# Patient Record
Sex: MALE | Race: WHITE | HISPANIC OR LATINO
[De-identification: names, ages, dates, MRNs, and addresses within clinical notes are randomized per-mention and may not be internally consistent; named-entity substitution may affect disease eponyms.]

---

## 2022-07-20 ENCOUNTER — APPOINTMENT (OUTPATIENT)
Dept: VASCULAR SURGERY | Facility: CLINIC | Age: 62
End: 2022-07-20

## 2022-08-03 ENCOUNTER — APPOINTMENT (OUTPATIENT)
Dept: VASCULAR SURGERY | Facility: CLINIC | Age: 62
End: 2022-08-03

## 2022-08-03 VITALS
TEMPERATURE: 98.1 F | BODY MASS INDEX: 29.99 KG/M2 | WEIGHT: 180 LBS | DIASTOLIC BLOOD PRESSURE: 70 MMHG | HEIGHT: 65 IN | SYSTOLIC BLOOD PRESSURE: 107 MMHG

## 2022-08-03 DIAGNOSIS — Z86.39 PERSONAL HISTORY OF OTHER ENDOCRINE, NUTRITIONAL AND METABOLIC DISEASE: ICD-10-CM

## 2022-08-03 PROCEDURE — 99203 OFFICE O/P NEW LOW 30 MIN: CPT

## 2022-08-03 PROCEDURE — 93970 EXTREMITY STUDY: CPT

## 2022-08-03 RX ORDER — CEPHALEXIN 500 MG/1
500 CAPSULE ORAL
Qty: 30 | Refills: 0 | Status: ACTIVE | COMMUNITY
Start: 2022-05-17

## 2022-08-03 RX ORDER — PRIMIDONE 50 MG/1
50 TABLET ORAL
Qty: 30 | Refills: 0 | Status: ACTIVE | COMMUNITY
Start: 2022-07-07

## 2022-08-03 RX ORDER — SIMVASTATIN 40 MG/1
40 TABLET, FILM COATED ORAL
Qty: 90 | Refills: 0 | Status: ACTIVE | COMMUNITY
Start: 2022-07-07

## 2022-08-03 RX ORDER — ATORVASTATIN CALCIUM 20 MG/1
20 TABLET, FILM COATED ORAL
Qty: 90 | Refills: 0 | Status: ACTIVE | COMMUNITY
Start: 2022-07-26

## 2022-08-03 RX ORDER — NAPROXEN 375 MG/1
375 TABLET, DELAYED RELEASE ORAL
Qty: 30 | Refills: 0 | Status: ACTIVE | COMMUNITY
Start: 2022-03-14

## 2022-08-03 RX ORDER — CYCLOBENZAPRINE HYDROCHLORIDE 5 MG/1
5 TABLET, FILM COATED ORAL
Qty: 10 | Refills: 0 | Status: ACTIVE | COMMUNITY
Start: 2022-03-01

## 2022-08-03 RX ORDER — RIVAROXABAN 10 MG/1
10 TABLET, FILM COATED ORAL
Qty: 30 | Refills: 0 | Status: ACTIVE | COMMUNITY
Start: 2022-07-07

## 2022-08-03 RX ORDER — CEFPODOXIME PROXETIL 200 MG/1
200 TABLET, FILM COATED ORAL
Qty: 20 | Refills: 0 | Status: ACTIVE | COMMUNITY
Start: 2022-05-22

## 2022-08-03 NOTE — PHYSICAL EXAM
[FreeTextEntry1] : LE vein findings: \par Varicosities (varicose) bilateral \par Edema  bilateral \par Skin changes dry, flaky skin, stasis dermatitis, hyperpigmentation in the gator area\par Ulcer none\par CEAP classification C3\par Palpable DP pulses bilaterally\par \par

## 2022-08-03 NOTE — HISTORY OF PRESENT ILLNESS
[FreeTextEntry1] : Mr. FLASH VEGA is a 62 year M who presents for initial  evaluation of bilateral leg pain for the past several months. \par Mr. VEGA leg discomfort is associated with leg swelling, fatigue, heaviness, achiness, restlessness, muscle cramping, itchiness, dry, flaky skin, and skin darkening at the ankles. \par He complains of painful varicose veins.\par His symptoms have persisted despite conservative management with leg elevation, exercise, attempted weight loss, over-the-counter medications (ibuprofen), and compression stocking use for more than 3 months. \par His symptoms are aggravated by weight bearing, prolonged standing, prolonged sitting, extended travel, exercise.\par Nothing helps to alleviate patient's symptoms. \par His symptoms interfere with the his ADLs such as work, shopping and housekeeping. \par Mr. VEGA risks factor for venous disease are _obesity,family history of venous disease, history of varicose veins, spider veins.\par He works at [employer] and stands for prolonged periods of time with the inability to take frequent breaks to elevate their legs. \par Previous treatment, vein procedures and vein surgeries include: wearing compression stockings for more than 3 months with little or no relief \par \par

## 2022-08-03 NOTE — ASSESSMENT
[FreeTextEntry1] : Mr. FLASH VEGA is a 62 year with persistent and worsening left lower extremity venous insufficiency, CEAP classification C3 which is unresponsive to at least 3 months of compression stockings 20-30 mmHg, leg elevation, exercise and over the counter pain medication_(Ibuprofen). Patient has complaints of worsening leg discomfort with swelling, fatigue, heaviness, achiness, cramping, restlessness, and painful varicosities. The patient’s symptoms interfere with their ADL’s, such as walking, shopping and house work, and their ability to work and exercise. Patient has intact pulses in both legs without evidence of arterial insufficiency. \par Treatment is indicated not for cosmetic reasons but for symptomatic venous reflux disease with symptoms which is refractory to conservative therapy. Venous duplex study demonstrates left lower extremity venous insufficiency. The need for definitive effective treatment is based on severe, interfering and worsening reflux symptoms with evidence of venous insufficiency on venous ultrasound. \par Patient is a candidate for endovenous ablation treatment of the left GSV and stab phlebectomies\par The risks and benefits of endovenous ablation treatment versus continued conservative management were discussed with the patient. Patient chooses endovenous ablation treatments and stab phlebectomies. Treatment plan to be scheduled.\par

## 2022-08-15 RX ORDER — SODIUM BICARBONATE 84 MG/ML
8.4 INJECTION, SOLUTION INTRAVENOUS
Qty: 5 | Refills: 0 | Status: COMPLETED | COMMUNITY
Start: 2022-08-15 | End: 2022-08-25

## 2022-08-15 RX ORDER — LIDOCAINE HYDROCHLORIDE 10 MG/ML
1 INJECTION, SOLUTION INFILTRATION; PERINEURAL
Qty: 50 | Refills: 0 | Status: COMPLETED | COMMUNITY
Start: 2022-08-15 | End: 2022-08-25

## 2022-09-15 ENCOUNTER — APPOINTMENT (OUTPATIENT)
Dept: VASCULAR SURGERY | Facility: CLINIC | Age: 62
End: 2022-09-15

## 2022-09-15 PROCEDURE — 36475 ENDOVENOUS RF 1ST VEIN: CPT | Mod: LT

## 2022-09-15 NOTE — PROCEDURE
[FreeTextEntry1] : left GSV RFA [FreeTextEntry3] : Procedural safety checklist and time out completed:\par Confirmed patient identification (Patient Name, , and/or medical record number including when possible affirmation by patient or parent/family/other).\par Confirmed procedure with the patient. Consent present, accurate and signed. \par Confirmed special equipment and supplies are present.\par Sterility confirmed. Position verified. \par Site/ side is marked and visible and confirmed. \par Procedure confirmed by consent. Accurate consent including side and site.\par Review of medical records, including venous ultrasound, noting correct procedure including site and side.\par MD/PA verifies presence and review of imaging studies and or written report of imaging studies.\par Agreement on the procedure to be performed\par Time out completed.\par All of the above has been confirmed by the team.\par All patient-specific concerns have been addressed. \par \par Indication: left lower extremity varicose veins with inflammation, leg pain, leg swelling, and leg cramping.  Venous insufficiency/ reflux.\par \par Procedure: radiofrequency ablation of the left great saphenous vein. \par 	\par Mr. FLASH VEGA is a 62 year old M with a history of left lower extremity varicose veins previously seen in the office.  Ultrasound examination demonstrated venous insufficiency. A trial of compression stockings, exercise, elevation, and pain medication was attempted without relief and definitive treatment with radiofrequency ablation was offered. \par \par The patient has come for radiofrequency ablation treatment of the left great saphenous vein.\par I have discussed the risks of the procedure at length with the patient. The risks discussed were inclusive of but not limited to infection, irritation at the site of infiltration of local anesthesia, and also rare risk of deep venous thrombosis and pulmonary emboli. The patient agrees to proceed with the procedure. \par The patient was escorted into the procedure room and a time out called.\par The entire limb was prepped and draped in sterile fashion. The RF fiber was placed on the sterile field and connected by a sterile cable. Actuation, temperature, and impedance testing were performed to ensure that all components were connected and operating properly. \par The patient was placed on the procedure table and local anesthesia was instilled in the skin overlying the access site. Under ultrasound guidance, the vein was punctured with a micropuncture needle, using the anterior wall technique. A guide wire was now introduced through the needle, and the needle was then exchanged over the guide wire for a 7F sheath. The guide wire was removed and the RF probe was then placed into the left great saphenous vein through the sheath and position confirmed using ultrasound guidance. After the RF probe position was verified by ultrasound, tumescent anesthesia consisting of normal saline, 1% lidocaine with 8.4% sodium bicarbonate was infiltrated, under ultrasound guidance, precisely into the perivenous compartment along the entire length of the vein until a “halo” of fluid was noted around the vein. After RF probe position was again confirmed with ultrasound imaging, RF energy was applied. The probe was gradually and carefully withdrawn at a rate of 6.5cm/20seconds. \par \par 6 cycles of RF performed using the 7 cm probe\par Total treatment time was 120 seconds.\par The total volume injected was 400 cc\par Treatment length was 33 cm and \par The probe is 3.5 cm from the SFJ.\par \par Estimated Blood Loss: minimal\par Repeat ultrasound of the treated vein was performed confirming successful treatment. The catheter and sheath were withdrawn and hemostasis established with direct pressure. After assuring hemostasis, a sterile 4x4 was placed on the access site and an ACE compression wrap was applied. Patient tolerated procedure well. Patient was given post-procedure instructions and follow up appointment was scheduled.\par \par \par

## 2022-09-22 ENCOUNTER — APPOINTMENT (OUTPATIENT)
Dept: VASCULAR SURGERY | Facility: CLINIC | Age: 62
End: 2022-09-22

## 2022-09-22 DIAGNOSIS — I83.893 VARICOSE VEINS OF BILATERAL LOWER EXTREMITIES WITH OTHER COMPLICATIONS: ICD-10-CM

## 2022-09-22 PROCEDURE — 37765 STAB PHLEB VEINS XTR 10-20: CPT | Mod: LT

## 2022-09-22 PROCEDURE — 93971 EXTREMITY STUDY: CPT

## 2022-09-22 RX ORDER — SODIUM BICARBONATE 84 MG/ML
8.4 INJECTION, SOLUTION INTRAVENOUS
Qty: 5 | Refills: 0 | Status: COMPLETED | COMMUNITY
Start: 2022-09-09 | End: 2022-09-22

## 2022-09-22 RX ORDER — ALPRAZOLAM 0.5 MG/1
0.5 TABLET ORAL
Qty: 1 | Refills: 0 | Status: COMPLETED | COMMUNITY
Start: 2022-09-15 | End: 2022-09-22

## 2022-09-22 RX ORDER — ALPRAZOLAM 0.5 MG/1
0.5 TABLET ORAL
Qty: 1 | Refills: 0 | Status: COMPLETED | COMMUNITY
Start: 2022-09-09 | End: 2022-09-22

## 2022-09-22 RX ORDER — LIDOCAINE HYDROCHLORIDE 10 MG/ML
1 INJECTION, SOLUTION INFILTRATION; PERINEURAL
Qty: 50 | Refills: 0 | Status: COMPLETED | COMMUNITY
Start: 2022-09-09 | End: 2022-09-22

## 2022-09-22 NOTE — PROCEDURE
[FreeTextEntry1] : left stab phlebectomy [FreeTextEntry3] : Procedural safety checklist and time out completed:\par Confirmed patient identification (Patient Name, , and/or medical record number including when possible affirmation by patient or parent/family/other.\par Confirmed procedure with the patient. Consent present, accurate and signed. \par Confirmed special equipment and supplies are present.\par Sterility confirmed. Position verified. \par Site/ side is marked and visible and confirmed. \par Procedure confirmed by consent. Accurate consent including side and site.\par Review of medical records noting correct procedure including site and side.\par MD/PA verifies presence and review of imaging studies and or written report of imaging studies.\par Specify equipment are available for the planned procedure.\par MD/PA has marked the patient's procedural site and side.\par Agreement on the procedure to be performed\par Time out completed.\par All of the above has been confirmed by the team.\par All patient-specific concerns have been addressed. \par \par Indication:  left lower extremity varicose veins with inflammation, leg pain, leg swelling, and leg cramping.  \par \par Procedure: Stab phlebectomy left lower extremity\par \par  Mr. FLASH VEGA is a 62 year old M with a history of symptomatic left lower extremity varicose veins. A trial of compression stockings, exercise, elevation, and pain medication was attempted without relief and definitive treatment with microphlebectomy was offered. \par \par I have discussed the risks of the procedure at length with the patient. The risks discussed were inclusive of but not limited to infection, irritation at the site of infiltration of local anesthesia, and also rare risk of deep venous thrombosis and pulmonary emboli. The patient agrees to proceed with the procedure. \par The patient was escorted into the procedure room, the varicose veins for treatment were marked out and the patient placed on the examination table. The entire limb was prepped and draped in sterile fashion and a  time out was called. \par \par Local anesthesia using 25 cc 1% lidocaine was infiltrated using a 25 gauge needle over the previously marked prominent varicose vein sites.\par Multiple small stab incisions, each less than 1 cm in length was made at the noted sites. With the help of a vein hook, the vein was fished out at each of these sites, rolled over a narrow-tipped mosquito clamp and removed. Hemostasis was secured with leg elevation and application of manual pressure. After assuring hemostasis, a sterile 4x4 was placed on the access sites and an ACE compression wrap was applied. Estimated blood loss: minimal. Patient tolerated procedure well. Patient was given post-procedure instructions and follow up appointment was scheduled. \par \par SIDE - LEFT	\par SITE - CALF\par LOCATION - MEDIAL / POSTERIOR	\par Total Stab Incisions 10-20\par \par

## 2022-10-11 ENCOUNTER — APPOINTMENT (OUTPATIENT)
Dept: VASCULAR SURGERY | Facility: CLINIC | Age: 62
End: 2022-10-11

## 2024-04-21 ENCOUNTER — INPATIENT (INPATIENT)
Facility: HOSPITAL | Age: 64
LOS: 3 days | Discharge: HOME | End: 2024-04-25
Attending: STUDENT IN AN ORGANIZED HEALTH CARE EDUCATION/TRAINING PROGRAM | Admitting: STUDENT IN AN ORGANIZED HEALTH CARE EDUCATION/TRAINING PROGRAM
Payer: MEDICAID

## 2024-04-21 VITALS
TEMPERATURE: 98 F | RESPIRATION RATE: 20 BRPM | HEIGHT: 65 IN | HEART RATE: 60 BPM | DIASTOLIC BLOOD PRESSURE: 87 MMHG | SYSTOLIC BLOOD PRESSURE: 134 MMHG | WEIGHT: 201.94 LBS | OXYGEN SATURATION: 98 %

## 2024-04-21 LAB
ALBUMIN SERPL ELPH-MCNC: 2.7 G/DL — LOW (ref 3.3–5)
ALP SERPL-CCNC: 91 U/L — SIGNIFICANT CHANGE UP (ref 40–120)
ALT FLD-CCNC: 24 U/L — SIGNIFICANT CHANGE UP (ref 12–78)
ANION GAP SERPL CALC-SCNC: 7 MMOL/L — SIGNIFICANT CHANGE UP (ref 5–17)
APTT BLD: 26.7 SEC — SIGNIFICANT CHANGE UP (ref 24.5–35.6)
AST SERPL-CCNC: 21 U/L — SIGNIFICANT CHANGE UP (ref 15–37)
BASOPHILS # BLD AUTO: 0.03 K/UL — SIGNIFICANT CHANGE UP (ref 0–0.2)
BASOPHILS NFR BLD AUTO: 0.4 % — SIGNIFICANT CHANGE UP (ref 0–2)
BILIRUB SERPL-MCNC: 0.4 MG/DL — SIGNIFICANT CHANGE UP (ref 0.2–1.2)
BUN SERPL-MCNC: 30 MG/DL — HIGH (ref 7–23)
CALCIUM SERPL-MCNC: 8.7 MG/DL — SIGNIFICANT CHANGE UP (ref 8.5–10.1)
CHLORIDE SERPL-SCNC: 111 MMOL/L — HIGH (ref 96–108)
CK SERPL-CCNC: 141 U/L — SIGNIFICANT CHANGE UP (ref 26–308)
CO2 SERPL-SCNC: 24 MMOL/L — SIGNIFICANT CHANGE UP (ref 22–31)
CREAT SERPL-MCNC: 1.04 MG/DL — SIGNIFICANT CHANGE UP (ref 0.5–1.3)
D DIMER BLD IA.RAPID-MCNC: 240 NG/ML DDU — HIGH
EGFR: 81 ML/MIN/1.73M2 — SIGNIFICANT CHANGE UP
EOSINOPHIL # BLD AUTO: 0.34 K/UL — SIGNIFICANT CHANGE UP (ref 0–0.5)
EOSINOPHIL NFR BLD AUTO: 5.1 % — SIGNIFICANT CHANGE UP (ref 0–6)
GLUCOSE SERPL-MCNC: 114 MG/DL — HIGH (ref 70–99)
HCT VFR BLD CALC: 43.6 % — SIGNIFICANT CHANGE UP (ref 39–50)
HGB BLD-MCNC: 13.9 G/DL — SIGNIFICANT CHANGE UP (ref 13–17)
IMM GRANULOCYTES NFR BLD AUTO: 0.3 % — SIGNIFICANT CHANGE UP (ref 0–0.9)
INR BLD: 0.87 RATIO — SIGNIFICANT CHANGE UP (ref 0.85–1.18)
LIDOCAIN IGE QN: 47 U/L — SIGNIFICANT CHANGE UP (ref 13–75)
LYMPHOCYTES # BLD AUTO: 1.85 K/UL — SIGNIFICANT CHANGE UP (ref 1–3.3)
LYMPHOCYTES # BLD AUTO: 27.7 % — SIGNIFICANT CHANGE UP (ref 13–44)
MAGNESIUM SERPL-MCNC: 2.1 MG/DL — SIGNIFICANT CHANGE UP (ref 1.6–2.6)
MCHC RBC-ENTMCNC: 28.3 PG — SIGNIFICANT CHANGE UP (ref 27–34)
MCHC RBC-ENTMCNC: 31.9 G/DL — LOW (ref 32–36)
MCV RBC AUTO: 88.6 FL — SIGNIFICANT CHANGE UP (ref 80–100)
MONOCYTES # BLD AUTO: 0.5 K/UL — SIGNIFICANT CHANGE UP (ref 0–0.9)
MONOCYTES NFR BLD AUTO: 7.5 % — SIGNIFICANT CHANGE UP (ref 2–14)
NEUTROPHILS # BLD AUTO: 3.94 K/UL — SIGNIFICANT CHANGE UP (ref 1.8–7.4)
NEUTROPHILS NFR BLD AUTO: 59 % — SIGNIFICANT CHANGE UP (ref 43–77)
NRBC # BLD: 0 /100 WBCS — SIGNIFICANT CHANGE UP (ref 0–0)
NT-PROBNP SERPL-SCNC: 84 PG/ML — SIGNIFICANT CHANGE UP (ref 0–125)
PLATELET # BLD AUTO: 182 K/UL — SIGNIFICANT CHANGE UP (ref 150–400)
POTASSIUM SERPL-MCNC: 4.4 MMOL/L — SIGNIFICANT CHANGE UP (ref 3.5–5.3)
POTASSIUM SERPL-SCNC: 4.4 MMOL/L — SIGNIFICANT CHANGE UP (ref 3.5–5.3)
PROT SERPL-MCNC: 7.3 GM/DL — SIGNIFICANT CHANGE UP (ref 6–8.3)
PROTHROM AB SERPL-ACNC: 10.5 SEC — SIGNIFICANT CHANGE UP (ref 9.5–13)
RBC # BLD: 4.92 M/UL — SIGNIFICANT CHANGE UP (ref 4.2–5.8)
RBC # FLD: 14.5 % — SIGNIFICANT CHANGE UP (ref 10.3–14.5)
SODIUM SERPL-SCNC: 142 MMOL/L — SIGNIFICANT CHANGE UP (ref 135–145)
TROPONIN I, HIGH SENSITIVITY RESULT: 14.2 NG/L — SIGNIFICANT CHANGE UP
WBC # BLD: 6.68 K/UL — SIGNIFICANT CHANGE UP (ref 3.8–10.5)
WBC # FLD AUTO: 6.68 K/UL — SIGNIFICANT CHANGE UP (ref 3.8–10.5)

## 2024-04-21 PROCEDURE — 70498 CT ANGIOGRAPHY NECK: CPT | Mod: 26,MC

## 2024-04-21 PROCEDURE — 99291 CRITICAL CARE FIRST HOUR: CPT

## 2024-04-21 PROCEDURE — 93010 ELECTROCARDIOGRAM REPORT: CPT

## 2024-04-21 PROCEDURE — 70450 CT HEAD/BRAIN W/O DYE: CPT | Mod: 26,MC,59

## 2024-04-21 PROCEDURE — 71045 X-RAY EXAM CHEST 1 VIEW: CPT | Mod: 26

## 2024-04-21 PROCEDURE — 70496 CT ANGIOGRAPHY HEAD: CPT | Mod: 26,MC

## 2024-04-21 RX ORDER — MECLIZINE HCL 12.5 MG
25 TABLET ORAL ONCE
Refills: 0 | Status: COMPLETED | OUTPATIENT
Start: 2024-04-21 | End: 2024-04-21

## 2024-04-21 RX ADMIN — Medication 25 MILLIGRAM(S): at 20:14

## 2024-04-21 NOTE — ED ADULT NURSE NOTE - NSFALLRISKINTERV_ED_ALL_ED

## 2024-04-21 NOTE — ED ADULT NURSE NOTE - NS ED NURSE DISCH DISPOSITION
Patient's lifetime risk of getting breast cancer is 11.2%  Their [place] Model Risk of getting breast cancer in the next 5 years is 0.8%    Average risk letter created and sent to patient via portal and via mail.            Admitted

## 2024-04-21 NOTE — ED PROVIDER NOTE - OBJECTIVE STATEMENT
63 year old man hx of HLD with sudden onset of blurry vision and dizziness described as lightheadedness and feeling he was going to pass out with mild spinning.  He was sitting at the table eating when the symptoms started so he walked to the bathroom where the symptoms became more intense and he collapsed to the ground.  No LOC.  Patient was too weak to stand and needed assistance by family to get off the ground and into a chair.  He denies palpitations chest pain, SOB, focal weakness and headache.  Patient's wife states that the patient did not look well and he had continued weakness until just prior to arrival.  Patient states that he is much more improved at this time but has slight lightheadedness currently.

## 2024-04-21 NOTE — ED ADULT NURSE NOTE - CHIEF COMPLAINT QUOTE
pt s/p syncope episode, pt c/o dizziness while in the bathroom, attempted to hold self up, became incontinent of urine and stool. pt given NS 250ml via Left 18G saline. hx: high cholesterol, vertigo. last known 1740. stroke eval done by MD Samson. stroke protocol not activated.

## 2024-04-21 NOTE — ED PROVIDER NOTE - CLINICAL SUMMARY MEDICAL DECISION MAKING FREE TEXT BOX
63 year old man hx of HLD with sudden onset of blurry vision and dizziness described as lightheadedness and feeling he was going to pass out with mild spinning.  He was sitting at the table eating when the symptoms started so he walked to the bathroom where the symptoms became more intense and he collapsed to the ground.  No LOC.  Patient was too weak to stand and needed assistance by family to get off the ground and into a chair.  He denies palpitations chest pain, SOB, focal weakness and headache.  Patient's wife states that the patient did not look well and he had continued weakness until just prior to arrival.  Patient states that he is much more improved at this time but has slight lightheadedness currently. 63 year old hx of HLD c/o dizziness lightheadedness more than vertigo associated with generalized weakness and headache.  Dizziness improving prior to arrival headache still slightly present.  Patient well appearing no acute distress, neurologically intact, no murmur, no focal weakness.  DDx includes: Benign positional vertigo, ACS/MI, ICH, dehydration.  Also consider metabolic and infectious causes.  Will check CT, EKG, Labs, Orthostatics and Re-eval.  Will consider trial of meclizine and IVF.    CT positive for ICH.  Patient continued on cardia monitor.  Transfer Center called to connect with Neurosurgery team at [facility].  Repeat CT recommended.    Repeat CT stable no worsening symptoms.  Patient admitted to ICU.

## 2024-04-21 NOTE — ED ADULT TRIAGE NOTE - CHIEF COMPLAINT QUOTE
pt s/p syncope episode, pt c/o dizziness while in the bathroom, attempted to hold self up, became incontinent of urine and stool. pt given NS 250ml via Left 18G saline. hx: high cholesterol, vertigo. last known 1740. pt s/p syncope episode, pt c/o dizziness while in the bathroom, attempted to hold self up, became incontinent of urine and stool. pt given NS 250ml via Left 18G saline. hx: high cholesterol, vertigo. last known 1740. stroke eval done by MD Samson. stroke protocol not activated.

## 2024-04-21 NOTE — ED PROVIDER NOTE - EYES, MLM
Clear bilaterally, pupils equal, round and reactive to light.  Slight nystagmus when looking to the left

## 2024-04-21 NOTE — ED ADULT NURSE NOTE - OBJECTIVE STATEMENT
63yM A&Ox3 presenting to ED with dizziness s/p syncopal episode at home. pt reports he was feeling dizzy while at home in the bathroom and he attempted to hold self up, became incontinent of urine and stool. pt given NS 250ml via Left 18G saline. hx: high cholesterol, vertigo. last known 1740. stroke eval done by MD Samson. stroke protocol not activated.

## 2024-04-22 PROCEDURE — 99291 CRITICAL CARE FIRST HOUR: CPT

## 2024-04-22 PROCEDURE — 70450 CT HEAD/BRAIN W/O DYE: CPT | Mod: 26,MC

## 2024-04-22 RX ORDER — NICARDIPINE HYDROCHLORIDE 30 MG/1
5 CAPSULE, EXTENDED RELEASE ORAL
Qty: 40 | Refills: 0 | Status: DISCONTINUED | OUTPATIENT
Start: 2024-04-22 | End: 2024-04-22

## 2024-04-22 RX ORDER — LABETALOL HCL 100 MG
10 TABLET ORAL EVERY 6 HOURS
Refills: 0 | Status: DISCONTINUED | OUTPATIENT
Start: 2024-04-22 | End: 2024-04-22

## 2024-04-22 RX ORDER — CHLORHEXIDINE GLUCONATE 213 G/1000ML
1 SOLUTION TOPICAL DAILY
Refills: 0 | Status: DISCONTINUED | OUTPATIENT
Start: 2024-04-22 | End: 2024-04-25

## 2024-04-22 RX ORDER — ATORVASTATIN CALCIUM 80 MG/1
20 TABLET, FILM COATED ORAL AT BEDTIME
Refills: 0 | Status: DISCONTINUED | OUTPATIENT
Start: 2024-04-22 | End: 2024-04-25

## 2024-04-22 RX ORDER — LISINOPRIL 2.5 MG/1
5 TABLET ORAL DAILY
Refills: 0 | Status: DISCONTINUED | OUTPATIENT
Start: 2024-04-22 | End: 2024-04-23

## 2024-04-22 RX ORDER — FAMOTIDINE 10 MG/ML
1 INJECTION INTRAVENOUS
Refills: 0 | DISCHARGE

## 2024-04-22 RX ADMIN — ATORVASTATIN CALCIUM 20 MILLIGRAM(S): 80 TABLET, FILM COATED ORAL at 21:11

## 2024-04-22 RX ADMIN — CHLORHEXIDINE GLUCONATE 1 APPLICATION(S): 213 SOLUTION TOPICAL at 11:31

## 2024-04-22 RX ADMIN — NICARDIPINE HYDROCHLORIDE 25 MG/HR: 30 CAPSULE, EXTENDED RELEASE ORAL at 04:23

## 2024-04-22 RX ADMIN — LISINOPRIL 5 MILLIGRAM(S): 2.5 TABLET ORAL at 04:23

## 2024-04-22 RX ADMIN — ATORVASTATIN CALCIUM 20 MILLIGRAM(S): 80 TABLET, FILM COATED ORAL at 03:53

## 2024-04-22 NOTE — PHYSICAL THERAPY INITIAL EVALUATION ADULT - GENERAL OBSERVATIONS, REHAB EVAL
Pt is alert, oriented x 4, follow instructions, on room air, no slurring of speech, denies pain, headaches, blurry vision, numbness or tinglingin the extremities and waekness in the extremities.,

## 2024-04-22 NOTE — PATIENT PROFILE ADULT - FALL HARM RISK - RISK INTERVENTIONS

## 2024-04-22 NOTE — PATIENT PROFILE ADULT - DO YOU EVER NEED HELP READING HOSPITAL MATERIALS?
The patient presents on follow-up for diarrhea.   On 6/21/22, the patient reported onset of intermittent watery diarrhea a couple years ago. Diarrhea was 1x/week with multiple episodes in a bout. He denied bleeding or abdominal pain. Between bouts, he sometimes could go at least 5 days without a BM. He noted increased diarrhea while on Biktarvy for HIV - d/c the med a year ago because of diarrhea. He said he had not discussed alternative meds with his PCP (FROILAN Best at [facility]).  He was only on vit C currently. Social history: Last alcohol use was a couple months ago. No tobacco use. Last marijuana use was 2 weeks ago - frequency was 1x/month.  HPI: Today, he says he got the x-ray done following our appt. He also sent in a stool sample in June, but there was an issue with the sample, so he had to resubmit a sample - resubmitted it yesterday. He is still having diarrhea. He saw his PCP since our visit - rx'd metronidazole and loperamide. No stool study was done with the PCP he states. He did not take metronidazole, but has been taking loperamide for the last 2 weeks, taking 2 pills 1x in a day. Loperamide helps for awhile, but diarrhea returns. He can go 1-3 days without a BM then have days of profuse diarrhea.  He has not been on HIV treatment for almost a year - had initially d/c Biktarvy because he was having diarrhea. He says his PCP has not discussed alternative treatment options with him.  Labs 6/21/22 - CBC normal except platelets 63. CMP normal except creatinine 1.55, AST 42, GFR 53. Celiac negative. TSH/FT4 normal. no

## 2024-04-22 NOTE — H&P ADULT - NSHPPHYSICALEXAM_GEN_ALL_CORE
Physical Examination:    General:  Alert, oriented, interactive, nonfocal    HEENT: Pupils equal, reactive to light.  Symmetric, symmetrical smile, no facial droop    PULM: Clear to auscultation bilaterally, no significant sputum production    CVS: Regular rate and rhythm    ABD: Soft, nondistended, nontender, normoactive bowel sounds    EXT: No edema, nontender    SKIN: Warm    NEURO: A&Ox3, strength 5/5 all extremities, cranial nerves grossly intact, no focal deficits

## 2024-04-22 NOTE — PATIENT PROFILE ADULT - NSPROPTRIGHTCAREGIVER_GEN_A_NUR
Reason for Disposition   Sounds like a life-threatening emergency to the triager    Protocols used:  DIABETES - HIGH BLOOD SUGAR-A-    Valencia, who had liver transplant on 12/31/2013, said she has been having CBG up to 600 since Monday.  She said she went to  ED on [place] yesterday, was hydrated, and then sent home for  yesterday.  She states she was 600 earlier today, called endocrine, and was told to bolus insulin, but it is now at 600 again.  Reporting weakness, and confusion reported earlier.  Recommend she call 911 for immediate medical attention.  As  does not have endocrine available, per Valencia (said she was told this by endocrine), she will go to [facility] ED.  Message to transplant team, Andrei Persaud and Bethany Gamble, Marisol in endocrine.  Please contact caller directly with any additional care advice.        yes

## 2024-04-22 NOTE — CONSULT NOTE ADULT - ASSESSMENT
62y/o M with PMH of HLD, GERD, BPH presents to VS with acute onset severe dizziness starting around 17:00 on 4/21 that caused him to fall on the shower. No reported hx of HTN but noted to be hypertensive in ED. Not on AC/AP at home  CTH with 8mm L parietal ICH, 6h scan stable   CTA neg for AVM, aneurysm  Exam grossly non-focal    L parietal ICH -would r/o underlying lesion    Plan  - SBP < 140  - no AC/AP  - MRI brain with and without contrast  - PT/OT  - SCDs for now - if stable can start DVT ppx tomorrow     Mabel Shultz DO  Vascular Neurology  Office [phone]  Available via Microsoft Teams

## 2024-04-22 NOTE — PATIENT PROFILE ADULT - NSPROIMPLANTSMEDDEV_GEN_A_NUR
Start Lasix 20 mg as needed for leg swelling with potassium chloride supplements 20 mEq on the days that you use Lasix on None

## 2024-04-22 NOTE — H&P ADULT - ASSESSMENT
Problem List:  1) ICH  2) hypertension    cause of ICH is not completely clear although most likely related to hypertension. he does not take any medications for hypertension and his wife states he BP has never been high. He did have high BP readings in Er high as 175 systolic  CT angiogram preformed without evidence of aneurysm  needs tight BP control, will monitor closely. Will start low dose ACE-I if BP remains > 140 systolic  PRn labetalol for systolic > 170  Check A1c, lipid panel  SCDs for DVt-P  PT evaluation   q2h neurochecks   neurosurgery consulted from outside hospital Er spoke with Dr. Amaro given ICH is stable on repeat CT head patient does not require transfer to another facility. Will monitor closely and obtain another scan if symptoms worsen.   Plan discussed with E-ICU    " Time spent on this patient encounter, which includes documenting this note in the electronic medical record, was 78 minutes including assessing the presenting problems with associated risks, reviewing the medical record to prepare for the encounter, and meeting face to face with patient to obtain additional history. I have also performed an appropriate physical exam, made interventions listed and ordered and interpreted appropriate diagnostic studies as documented. To improve communication and patient saftey, I have coordinated care with the multidisciplinary team including the bedside nurse, appropriate attending of record and consultants as needed. "    Date of Entry of this note is equal to the date of services rendered Problem List:  1) ICH  2) hypertension    cause of ICH is not completely clear although most likely related to hypertension. he does not take any medications for hypertension and his wife states he BP has never been high. He did have high BP readings in Er high as 175 systolic  CT angiogram preformed without evidence of aneurysm  needs tight BP control, will monitor closely. Will start low dose ACE-I if BP remains > 140 systolic  PRn labetalol for systolic > 170  Check A1c, lipid panel  SCDs for DVt-P  PT evaluation   q2h neurochecks   neurosurgery consulted from outside hospital Er spoke with Dr. Amaro given ICH is stable on repeat CT head patient does not require transfer to another facility. Will monitor closely and obtain another scan if symptoms worsen.   Plan discussed with E-ICU Dr. Hernandez     " Time spent on this patient encounter, which includes documenting this note in the electronic medical record, was 78 minutes including assessing the presenting problems with associated risks, reviewing the medical record to prepare for the encounter, and meeting face to face with patient to obtain additional history. I have also performed an appropriate physical exam, made interventions listed and ordered and interpreted appropriate diagnostic studies as documented. To improve communication and patient saftey, I have coordinated care with the multidisciplinary team including the bedside nurse, appropriate attending of record and consultants as needed. "    Date of Entry of this note is equal to the date of services rendered

## 2024-04-22 NOTE — PHYSICAL THERAPY INITIAL EVALUATION ADULT - PERTINENT HX OF CURRENT PROBLEM, REHAB EVAL
Pt is admitted with c/o acute onset of severe dizziness, fell in the shower but denies hitting head or loss of consciousness, CT 4/21 head: 88 mm, intraparynchemal hemorrage L superior parital lobe.

## 2024-04-22 NOTE — PHYSICAL THERAPY INITIAL EVALUATION ADULT - STRENGTHENING, PT EVAL
Improve strength to 5/5, improve endurance to good and retun to previous level of functional independence.

## 2024-04-22 NOTE — H&P ADULT - CRITICAL CARE ATTENDING COMMENT
Pt is a 62 yo HM with h/o HLD, GERD and BPH presented 2 to acute onset severe dizziness starting around 5pm on 4/21 that caused him to fall in the shower. Allegedly max . Pt sent for CT head: 8mm L parietal ICH, 6h scan stable. CTA neg for AVM, aneurysm. ICU dx: L parietal ICH ? hypertensive bleed    CVS: Avoid extremes of BP/ May cont ACE(-)  HEME: DVT prophylaxis with Venodynes (No Heparin or NSAIDs)  FEN: Avoid hyponatremia/ Po diet  Endo: Goal Glu 120-180  Neuro: Pt asymptomatic, cont to follow neuro exam/ Neuro consult noted

## 2024-04-22 NOTE — CONSULT NOTE ADULT - SUBJECTIVE AND OBJECTIVE BOX
Neurology Consult    Reason for Consult: Patient is a 63y old  Male who presents with a chief complaint of ICH (22 Apr 2024 02:13)      HPI:  64y/o M with PMH of HLD, GERD, BPH presents to VS with acute onset severe dizziness starting around 17:00 on 4/21. He states he has been getting periods of dizziness for years which resolve once he walks around but today it did not subside. He felt so dizzy that he fell in the shower. States he did not fall hard but did hit his head, no LOC. Currently he feels better, no headache, no dizziness, no vision changes, no weakness. He takes Lipitor and  occasional pepcid. 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 Non smoker, no etoh abuse. No history of hypertension but in the ER had some  systolic, 145 systolic, averaging around 135 systolic. He does not take ASA or anticoagulants.    (22 Apr 2024 02:13)       PAST MEDICAL & SURGICAL HISTORY:  HLD (hyperlipidemia)          Allergies: Allergies    No Known Allergies    Intolerances        Social History: Denies toxic habits including tobacco, ETOH or illicit drugs.    Family History: FAMILY HISTORY:  . No family history of strokes    Medications: MEDICATIONS  (STANDING):  atorvastatin 20 milliGRAM(s) Oral at bedtime  chlorhexidine 2% Cloths 1 Application(s) Topical daily  lisinopril 5 milliGRAM(s) Oral daily    MEDICATIONS  (PRN):      Review of Systems:  CONSTITUTIONAL:  No weight loss, fever, chills, weakness or fatigue.  HEENT:  Eyes:  No visual loss, blurred vision, double vision or yellow sclera. Ears, Nose, Throat:  No hearing loss, sneezing, congestion, runny nose or sore throat.  SKIN:  No rash or itching.  CARDIOVASCULAR:  No chest pain, chest pressure or chest discomfort. No palpitations or edema.  RESPIRATORY:  No shortness of breath, cough or sputum.  GASTROINTESTINAL:  No anorexia, nausea, vomiting or diarrhea. No abdominal pain or blood.  GENITOURINARY:  No burning on urination or incontinence   NEUROLOGICAL:  No headache, dizziness, syncope, paralysis, ataxia, numbness or tingling in the extremities. No change in bowel or bladder control. no limb weakness. no vision changes.   MUSCULOSKELETAL:  No muscle, back pain, joint pain or stiffness.  HEMATOLOGIC:  No anemia, bleeding or bruising.  LYMPHATICS:  No enlarged nodes. No history of splenectomy.  PSYCHIATRIC:  No history of depression or anxiety.  ENDOCRINOLOGIC:  No reports of sweating, cold or heat intolerance. No polyuria or polydipsia.      Vitals:  Vital Signs Last 24 Hrs  T(C): 36.2 (22 Apr 2024 12:00), Max: 36.7 (22 Apr 2024 02:13)  T(F): 97.2 (22 Apr 2024 12:00), Max: 98 (22 Apr 2024 02:13)  HR: 61 (22 Apr 2024 12:00) (55 - 85)  BP: 171/90 (22 Apr 2024 12:00) (100/69 - 199/103)  BP(mean): 111 (22 Apr 2024 12:00) (74 - 136)  RR: 34 (22 Apr 2024 12:00) (20 - 41)  SpO2: 100% (22 Apr 2024 12:00) (91% - 100%)    Parameters below as of 22 Apr 2024 03:30  Patient On (Oxygen Delivery Method): room air        General Exam:   General Appearance: Appropriately dressed and in no acute distress       Head: Normocephalic, atraumatic and no dysmorphic features  Ear, Nose, and Throat: Moist mucous membranes  CVS: S1S2+  Resp: No SOB, no wheeze or rhonchi  GI: soft NT/ND  Extremities: No edema or cyanosis  Skin: No bruises or rashes     Neurological Exam:  Mental Status: Awake, alert and oriented x 3.  Able to follow simple and complex verbal commands. Able to name and repeat. fluent speech. No obvious aphasia or dysarthria noted.   Cranial Nerves: PERRL, EOMI, VFFC, sensation V1-V3 intact,  no obvious facial asymmetry, equal elevation of palate, scm/trap 5/5, tongue is midline on protrusion. hearing is grossly intact.   Motor: Normal bulk, tone and strength throughout. Fine finger movements were intact and symmetric. no tremors or drift noted.    Sensation: Intact to light touch and pinprick throughout. no right/left confusion. no extinction to tactile on DSS  Reflexes: 1+ throughout at biceps, brachioradialis, triceps, patellars and ankles bilaterally and equal. No clonus. R toe and L toe were both downgoing.  Coordination: No dysmetria on FNF  Gait: deferred    Data/Labs/Imaging which I personally reviewed.     Labs:     CBC Full  -  ( 21 Apr 2024 18:58 )  WBC Count : 6.68 K/uL  RBC Count : 4.92 M/uL  Hemoglobin : 13.9 g/dL  Hematocrit : 43.6 %  Platelet Count - Automated : 182 K/uL  Mean Cell Volume : 88.6 fl  Mean Cell Hemoglobin : 28.3 pg  Mean Cell Hemoglobin Concentration : 31.9 g/dL  Auto Neutrophil # : 3.94 K/uL  Auto Lymphocyte # : 1.85 K/uL  Auto Monocyte # : 0.50 K/uL  Auto Eosinophil # : 0.34 K/uL  Auto Basophil # : 0.03 K/uL  Auto Neutrophil % : 59.0 %  Auto Lymphocyte % : 27.7 %  Auto Monocyte % : 7.5 %  Auto Eosinophil % : 5.1 %  Auto Basophil % : 0.4 %    04-21    142  |  111<H>  |  30<H>  ----------------------------<  114<H>  4.4   |  24  |  1.04    Ca    8.7      21 Apr 2024 18:58  Mg     2.1     04-21    TPro  7.3  /  Alb  2.7<L>  /  TBili  0.4  /  DBili  x   /  AST  21  /  ALT  24  /  AlkPhos  91  04-21    LIVER FUNCTIONS - ( 21 Apr 2024 18:58 )  Alb: 2.7 g/dL / Pro: 7.3 gm/dL / ALK PHOS: 91 U/L / ALT: 24 U/L / AST: 21 U/L / GGT: x           PT/INR - ( 21 Apr 2024 18:58 )   PT: 10.5 sec;   INR: 0.87 ratio         PTT - ( 21 Apr 2024 18:58 )  PTT:26.7 sec  Urinalysis Basic - ( 21 Apr 2024 18:58 )    Color: x / Appearance: x / SG: x / pH: x  Gluc: 114 mg/dL / Ketone: x  / Bili: x / Urobili: x   Blood: x / Protein: x / Nitrite: x   Leuk Esterase: x / RBC: x / WBC x   Sq Epi: x / Non Sq Epi: x / Bacteria: x          < from: CT Head No Cont (04.21.24 @ 19:24) >    ACC: [id number] EXAM:  CT BRAIN   ORDERED BY: Yecenia Samson     PROCEDURE DATE:  04/21/2024          INTERPRETATION:  CLINICAL INDICATION: head injury sp syncope, dizzines.    TECHNIQUE: CT of the head was performed without the administration of   intravenous contrast.    COMPARISON: None    FINDINGS:  8mm intraparenchymal hemorrhage left superior parietal lobe with mild   surrounding vasogenic edema. No other intracranial hemorrhage is noted.   No acute territorial infarcts. No hydrocephalus or extra-axial fluid   collections. No mass effect or midline shift.    The visualized intraorbital contents are unremarkable. The imaged   portions of the paranasal sinuses are clear. The mastoid air cells are   clear. The visualized soft tissues and osseous structures appear normal.    IMPRESSION:    8mm intraparenchymal hemorrhage left superior parietal lobe. Correlation   with CT angiogram of the brain recommended.    < end of copied text >  < from: CT Angio Head w/ IV Cont (04.21.24 @ 21:37) >    ACC: [id number] EXAM:  CT ANGIO NECK (W)AW IC   ORDERED BY: Joyce Voss     ACC: [id number] EXAM:  CT ANGIO BRAIN (W)AW IC   ORDERED BY: Joyce Voss     PROCEDURE DATE:  04/21/2024          INTERPRETATION:  CLINICAL INDICATION:  Dizziness.    TECHNIQUE: A bolus of IV contrast is administered to acquire a CT   angiogram of the vertebral and carotid arterial vasculature from the   aortic arch to the skull vertex.  90 mL of Omnipaque 350 was injected   intravenously. 10 mL was discarded. MIP images were also obtained.        FINDINGS:      CTA [place]:    ANTERIOR CIRCULATION  ICA  CAVERNOUS, SUPRACLINOID, BIFURCATION SEGMENTS: Patent without flow   limiting stenosis.  ANTERIOR CEREBRAL ARTERIES: Bilateral A1, anterior communicating and A2   anterior cerebral arteries are unremarkable in course and caliber without   flow limiting stenosis.    MIDDLE CEREBRAL ARTERIES: Patent bilateral M1, M2, and distal MCA   branches without flow limiting stenosis.      POSTERIOR CIRCULATION:  VERTEBRAL ARTERIES: Patent without flow limiting stenosis.  BASILAR ARTERY: Patent no flow limiting stenosis.  POSTERIOR CEREBRAL ARTERIES: Patent without flow limiting stenosis.      CTA NECK:  GREAT VESSELS: Visualized segments are patent, no flow limiting stenosis.    COMMON CAROTID ARTERIES:  RIGHT CCA: Patent without flow limiting stenosis  LEFT CCA: Patent without flow limiting stenosis    CAROTID BULBS:  RIGHT CB: Patent without flow limiting stenosis  LEFT CB: Patent without flow limiting stenosis    INTERNAL CAROTID ARTERIES:  RIGHT ICA: Patent no evidence for any hemodynamically significant   stenosis at the ICA origin by NASCET criteria.  LEFT ICA: Patent no evidence for any hemodynamically significant stenosis   at the ICA origin by NASCET criteria.    VERTEBRAL ARTERIES:  RIGHT VA: Patent no evidence for any flow limiting stenosis.  LEFT VA: Patent no evidence for any flow limiting stenosis.      SOFT TISSUES: Unremarkable  BONES: Unremarkable      IMPRESSION:    CTA NECK:  No significant stenosis of the cervical carotid or vertebral   arteries.    CTA HEAD:  No significant stenosis or occlusion of the major proximal   branches of the [place].    --- End of Report ---      < end of copied text >  < from: CT Head No Cont (04.22.24 @ 01:15) >    ACC: [id number] EXAM:  CT BRAIN   ORDERED BY: Joyce Voss     PROCEDURE DATE:  04/22/2024          INTERPRETATION:  HEAD CT WITHOUT CONTRAST dated 4/22/2024 1:14 AM    HISTORY: repeat CT r/o worsening hemorrhage.    TECHNIQUE: Head CT was performedwithout intravenous contrast. Axial,   sagittal, and coronal images were obtained.    COMPARISON: Head CT 4/21/2024 7:25 PM. CTA of the head and neck 4/21/2024.    FINDINGS:    The 1.7 x 1.0 x 0.8 cm hyperdense lesion within the left parietal   subcortical white matter does not appear substantially changed. There is   again minimal hypodensity partially surrounding this lesion which may   represent gliosis or vasogenic edema. No significant enhancement was seen   in this lesion on the earlier CTA.    The parenchymal volume is normal for patient's age. No new hemorrhage,   acute transcortical infarction, mass effect, or hydrocephalus is   identified.    The calvarium is intact. The visualized paranasal sinuses and mastoid air   cells are predominantly clear.    IMPRESSION:    No substantial change in the hyperdense lesion within the left parietal   subcortical white matter since the previous day. This may represent   hemorrhage, cavernous malformation, or other hemorrhagic lesion. Consider  further evaluation with contrast-enhanced brain MRI.    --- End of Report ---    < end of copied text >

## 2024-04-22 NOTE — PHYSICAL THERAPY INITIAL EVALUATION ADULT - GAIT TRAINING, PT EVAL
Independent in ambulation with normal gait pattern, without use of assistive device up to 200 or more, independent in community ambulation without use of assistive device.

## 2024-04-22 NOTE — H&P ADULT - HISTORY OF PRESENT ILLNESS
62y/o M with PMH of HLD, GERD, BPH presents to  with acute onset severe dizziness starting around 17:00 on 4/21. He states he has been getting periods of dizziness for years which resolve once he walks around but today it did not subside. He felt so dizzy that he fell in the shower. States he did not fall hard but did hit his head, no LOC. Currently he feels better, no headache, no dizziness, no vision changes, no weakness. He takes Lipitor and  occasional pepcid. 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 Non smoker, no etoh abuse. No history of hypertension but in the ER had some  systolic, 145 systolic, averaging around 135 systolic. He does not take ASA or anticoagulants.

## 2024-04-22 NOTE — PHYSICAL THERAPY INITIAL EVALUATION ADULT - LIVES WITH, PROFILE
Pt states he lives in a pvt house with wife and children, has 3 steps to enter the house with rails, stays on main floor./spouse

## 2024-04-22 NOTE — PHYSICAL THERAPY INITIAL EVALUATION ADULT - DIAGNOSIS, PT EVAL
Pt presents ability to perform functional tasks,- bed mobility, transfers and ambulation with supervision to independent and does not need any walking device, denies any symtons

## 2024-04-22 NOTE — PATIENT PROFILE ADULT - PRO INTERPRETER NEED 2
[facility] called back to see if they can get approval from his insurance company.  Spoke with the pharmacist to place a request for a lost medication override.   English

## 2024-04-23 LAB
A1C WITH ESTIMATED AVERAGE GLUCOSE RESULT: 5.6 % — SIGNIFICANT CHANGE UP (ref 4–5.6)
ALBUMIN SERPL ELPH-MCNC: 3 G/DL — LOW (ref 3.3–5)
ALP SERPL-CCNC: 94 U/L — SIGNIFICANT CHANGE UP (ref 40–120)
ALT FLD-CCNC: 23 U/L — SIGNIFICANT CHANGE UP (ref 12–78)
ANION GAP SERPL CALC-SCNC: 7 MMOL/L — SIGNIFICANT CHANGE UP (ref 5–17)
AST SERPL-CCNC: 16 U/L — SIGNIFICANT CHANGE UP (ref 15–37)
BILIRUB SERPL-MCNC: 0.5 MG/DL — SIGNIFICANT CHANGE UP (ref 0.2–1.2)
BUN SERPL-MCNC: 24 MG/DL — HIGH (ref 7–23)
CALCIUM SERPL-MCNC: 8.9 MG/DL — SIGNIFICANT CHANGE UP (ref 8.5–10.1)
CHLORIDE SERPL-SCNC: 110 MMOL/L — HIGH (ref 96–108)
CHOLEST SERPL-MCNC: 202 MG/DL — HIGH
CO2 SERPL-SCNC: 26 MMOL/L — SIGNIFICANT CHANGE UP (ref 22–31)
CREAT SERPL-MCNC: 0.92 MG/DL — SIGNIFICANT CHANGE UP (ref 0.5–1.3)
EGFR: 94 ML/MIN/1.73M2 — SIGNIFICANT CHANGE UP
ESTIMATED AVERAGE GLUCOSE: 114 MG/DL — SIGNIFICANT CHANGE UP (ref 68–114)
GLUCOSE SERPL-MCNC: 97 MG/DL — SIGNIFICANT CHANGE UP (ref 70–99)
HCT VFR BLD CALC: 44.4 % — SIGNIFICANT CHANGE UP (ref 39–50)
HCV AB S/CO SERPL IA: 0.17 S/CO — SIGNIFICANT CHANGE UP (ref 0–0.99)
HCV AB SERPL-IMP: SIGNIFICANT CHANGE UP
HDLC SERPL-MCNC: 45 MG/DL — SIGNIFICANT CHANGE UP
HGB BLD-MCNC: 14.1 G/DL — SIGNIFICANT CHANGE UP (ref 13–17)
LIPID PNL WITH DIRECT LDL SERPL: 134 MG/DL — HIGH
MAGNESIUM SERPL-MCNC: 2.2 MG/DL — SIGNIFICANT CHANGE UP (ref 1.6–2.6)
MCHC RBC-ENTMCNC: 28 PG — SIGNIFICANT CHANGE UP (ref 27–34)
MCHC RBC-ENTMCNC: 31.8 G/DL — LOW (ref 32–36)
MCV RBC AUTO: 88.1 FL — SIGNIFICANT CHANGE UP (ref 80–100)
NON HDL CHOLESTEROL: 156 MG/DL — HIGH
NRBC # BLD: 0 /100 WBCS — SIGNIFICANT CHANGE UP (ref 0–0)
PHOSPHATE SERPL-MCNC: 3.5 MG/DL — SIGNIFICANT CHANGE UP (ref 2.5–4.5)
PLATELET # BLD AUTO: 179 K/UL — SIGNIFICANT CHANGE UP (ref 150–400)
POTASSIUM SERPL-MCNC: 4.4 MMOL/L — SIGNIFICANT CHANGE UP (ref 3.5–5.3)
POTASSIUM SERPL-SCNC: 4.4 MMOL/L — SIGNIFICANT CHANGE UP (ref 3.5–5.3)
PROT SERPL-MCNC: 7.1 GM/DL — SIGNIFICANT CHANGE UP (ref 6–8.3)
RBC # BLD: 5.04 M/UL — SIGNIFICANT CHANGE UP (ref 4.2–5.8)
RBC # FLD: 14.8 % — HIGH (ref 10.3–14.5)
SODIUM SERPL-SCNC: 143 MMOL/L — SIGNIFICANT CHANGE UP (ref 135–145)
TRIGL SERPL-MCNC: 126 MG/DL — SIGNIFICANT CHANGE UP
WBC # BLD: 6.46 K/UL — SIGNIFICANT CHANGE UP (ref 3.8–10.5)
WBC # FLD AUTO: 6.46 K/UL — SIGNIFICANT CHANGE UP (ref 3.8–10.5)

## 2024-04-23 PROCEDURE — 99233 SBSQ HOSP IP/OBS HIGH 50: CPT

## 2024-04-23 RX ORDER — ENOXAPARIN SODIUM 100 MG/ML
40 INJECTION SUBCUTANEOUS EVERY 24 HOURS
Refills: 0 | Status: DISCONTINUED | OUTPATIENT
Start: 2024-04-23 | End: 2024-04-25

## 2024-04-23 RX ORDER — SODIUM CHLORIDE 9 MG/ML
1000 INJECTION, SOLUTION INTRAVENOUS ONCE
Refills: 0 | Status: COMPLETED | OUTPATIENT
Start: 2024-04-23 | End: 2024-04-23

## 2024-04-23 RX ORDER — SODIUM CHLORIDE 9 MG/ML
500 INJECTION INTRAMUSCULAR; INTRAVENOUS; SUBCUTANEOUS ONCE
Refills: 0 | Status: COMPLETED | OUTPATIENT
Start: 2024-04-23 | End: 2024-04-23

## 2024-04-23 RX ADMIN — CHLORHEXIDINE GLUCONATE 1 APPLICATION(S): 213 SOLUTION TOPICAL at 11:56

## 2024-04-23 RX ADMIN — SODIUM CHLORIDE 500 MILLILITER(S): 9 INJECTION INTRAMUSCULAR; INTRAVENOUS; SUBCUTANEOUS at 04:00

## 2024-04-23 RX ADMIN — ENOXAPARIN SODIUM 40 MILLIGRAM(S): 100 INJECTION SUBCUTANEOUS at 11:56

## 2024-04-23 RX ADMIN — ATORVASTATIN CALCIUM 20 MILLIGRAM(S): 80 TABLET, FILM COATED ORAL at 21:50

## 2024-04-23 RX ADMIN — SODIUM CHLORIDE 1000 MILLILITER(S): 9 INJECTION, SOLUTION INTRAVENOUS at 12:22

## 2024-04-23 NOTE — PROGRESS NOTE ADULT - SUBJECTIVE AND OBJECTIVE BOX
HPI:  Pt is a 62 yo HM with h/o HLD, GERD and BPH presented 2 to acute onset severe dizziness starting around 5pm on  that caused him to fall in the shower. Allegedly max . Pt sent for CT head: 8mm L parietal ICH, 6h scan stable. CTA neg for AVM, aneurysm. ICU dx: L parietal ICH ? hypertensive bleed      ## Labs:  CBC:                        14.1   6.46  )-----------( 179      ( 2024 00:55 )             44.4     Chem:      143  |  110<H>  |  24<H>  ----------------------------<  97  4.4   |  26  |  0.92    Ca    8.9      2024 00:55  Phos  3.5       Mg     2.2         TPro  7.1  /  Alb  3.0<L>  /  TBili  0.5  /  DBili  x   /  AST  16  /  ALT  23  /  AlkPhos  94      Coags:  PT/INR - ( 2024 18:58 )   PT: 10.5 sec;   INR: 0.87 ratio         PTT - ( 2024 18:58 )  PTT:26.7 sec        ## Imaging:    ## Medications:        atorvastatin 20 milliGRAM(s) Oral at bedtime    enoxaparin Injectable 40 milliGRAM(s) SubCutaneous every 24 hours          ## Vitals:  T(C): 36.1 (24 @ 04:00), Max: 36.3 (24 @ 16:30)  HR: 63 (24 @ 08:00) (59 - 82)  BP: 113/72 (24 @ 08:00) (75/51 - 171/92)  BP(mean): 84 (24 @ 08:00) (43 - 111)  RR: 26 (24 @ 08:00) (17 - 44)  SpO2: 96% (24 @ 08:00) (88% - 99%)  Wt(kg): --  Vent:   AB-22 @ 07:01  -   @ 07:00  --------------------------------------------------------  IN: 600 mL / OUT: 2450 mL / NET: -1850 mL          ## P/E:  Gen: lying comfortably in bed in no apparent distress  Lungs: CTA  Heart: RRR  Abd: Soft/+BS/ Non-tender  Ext: No edema  Neuro: AAo x3/ No motor or sensory deficits    CENTRAL LINE: [ ] YES [ ] NO  LOCATION:   DATE INSERTED:  REMOVE: [ ] YES [ ] NO      LINK: [ ] YES [ ] NO    DATE INSERTED:  REMOVE:  [ ] YES [ ] NO      A-LINE:  [ ] YES [ ] NO  LOCATION:   DATE INSERTED:  REMOVE:  [ ] YES [ ] NO  EXPLAIN:    CODE STATUS: [x ] full code  [ ] DNR  [ ] DNI  [ ] [facility]  Goals of care discussion: [ ] yes

## 2024-04-23 NOTE — PROGRESS NOTE ADULT - ASSESSMENT
Pt is a 62 yo HM with h/o HLD, GERD and BPH presented 2 to acute onset severe dizziness starting around 5pm on 4/21 that caused him to fall in the shower. Allegedly max . Pt sent for CT head: 8mm L parietal ICH, 6h scan stable. CTA neg for AVM, aneurysm. ICU dx: L parietal ICH ? hypertensive bleed    CVS: Avoid extremes of BP/ Hold ACE(-) BP on the lower side  HEME: DVT prophylaxis with Lovenox  FEN: Avoid hyponatremia/ Po diet  Endo: Goal Glu 120-180  Neuro: MRI head/ Neuro f/u prn  Social: PT/OT/ May transfer to [facility]

## 2024-04-23 NOTE — OCCUPATIONAL THERAPY INITIAL EVALUATION ADULT - PERTINENT HX OF CURRENT PROBLEM, REHAB EVAL
62y/o M with PMH of HLD, GERD, BPH presents to VS with acute onset severe dizziness starting around 17:00 on 4/21. He states he has been getting periods of dizziness for years which resolve once he walks around but today it did not subside. 64y/o M with PMH of HLD, GERD, BPH presents to  with acute onset severe dizziness starting around 17:00 on 4/21. He states he has been getting periods of dizziness for years which resolve once he walks around but today it did not subside.  CT findings: Intraparenchymal hemorrhage of L superior parietal lobe c mild surroundings. No other intracranial hemorrhage noted. No acute infarcts present.  MRI: pending

## 2024-04-23 NOTE — OCCUPATIONAL THERAPY INITIAL EVALUATION ADULT - GENERAL OBSERVATIONS, REHAB EVAL
Chart reviewed. Pt encountered semi-supine in bed, NAD, +IV heplock, +CCU cardiac monitor, all lines intact. A&Ox4. Pt agreeable to OT milton. Chart reviewed. Pt encountered semi-supine in bed, NAD, +IV heplock, +CCU cardiac monitor, all lines intact. Pt presents with no slurred speech or facial droop.A&Ox4. Pt agreeable to OT milton. Chart reviewed. Pt encountered seated in bedside recliner, NAD, +IV heplock, +CCU cardiac monitor, all lines intact. Pt presents with no slurred speech or facial droop. A&Ox4. Pt agreeable to OT eval.

## 2024-04-23 NOTE — CHART NOTE - NSCHARTNOTEFT_GEN_A_CORE
ICU Transfer Note  ---------------------------  Transfer from: ICU  Transfer to:  (X) Medicine    (  ) Telemetry    (  ) RCU    (  ) Palliative    (  ) Stroke Unit    (  ) _______________    ICU COURSE    Pt is a 63 year old male with PMH of HLD, GERD, and BPH presents to [facility] with acute onset severe dizziness starting around 17:00 on . He states he has been getting periods of dizziness for years which resolve once he walks around this time it did not subside. He felt so dizzy that he fell in the shower. States he did not fall hard but did hit his head, no LOC.  He  taking ASA or anticoagulants. He was hypertensive in the ED. CT head revealed a intraparenchymal hemorrhage. He was admitted to the ICU for frequently neurological assessments. In the ICU required a nicardipine drip. Pt has been weaned of blood pressure medication. Pt seen and examined during AM rounds, placed out of bed to chair by nursing. Stable for downgrade to medicine.    GENERAL: NAD, lying in bed comfortably  HEAD:  Atraumatic, normocephalic  EYES: EOMI, PERRLA, conjunctiva and sclera clear  NECK: Supple, trachea midline, no JVD  HEART: Regular rate and rhythm  LUNGS: Lung sounds clear, diminished at the bases  ABDOMEN: Soft, nontender, nondistended, +BS  EXTREMITIES: 2+ peripheral pulses bilaterally. No clubbing, cyanosis, or edema  NERVOUS SYSTEM:  A&Ox3, moving all extremities, no focal deficits   SKIN: No rashes or lesions    OBJECTIVE --  Vital Signs Last 24 Hrs  T(C): 36.1 (2024 04:00), Max: 36.3 (2024 16:30)  T(F): 97 (2024 04:00), Max: 97.4 (2024 16:30)  HR: 63 (2024 08:00) (59 - 82)  BP: 113/72 (2024 08:00) (75/51 - 171/92)  BP(mean): 84 (2024 08:00) (43 - 111)  RR: 26 (2024 08:00) (17 - 44)  SpO2: 96% (2024 08:00) (88% - 99%)    Parameters below as of 2024 19:02  Patient On (Oxygen Delivery Method): room air    I&O's Summary    2024 07:01  -  2024 07:00  --------------------------------------------------------  IN: 600 mL / OUT: 2450 mL / NET: -1850 mL    MEDICATIONS  (STANDING):  atorvastatin 20 milliGRAM(s) Oral at bedtime  chlorhexidine 2% Cloths 1 Application(s) Topical daily  enoxaparin Injectable 40 milliGRAM(s) SubCutaneous every 24 hours    MEDICATIONS  (PRN):    LABS                                            14.1                  Neurophils% (auto):   x      ( @ 00:55):    6.46 )-----------(179          Lymphocytes% (auto):  x                                             44.4                   Eosinphils% (auto):   x        Manual%: Neutrophils x    ; Lymphocytes x    ; Eosinophils x    ; Bands%: x    ; Blasts x                                    143    |  110    |  24                  Calcium: 8.9   / iCa: x      ( @ 00:55)    ----------------------------<  97        Magnesium: 2.2                              4.4     |  26     |  0.92             Phosphorous: 3.5      TPro  7.1    /  Alb  3.0    /  TBili  0.5    /  DBili  x      /  AST  16     /  ALT  23     /  AlkPhos  94     2024 00:55      ASSESSMENT & PLAN:     Pt is a 63 year old male with PMH of HLD, GERD, and BPH presents to [facility] with acute onset severe dizziness starting around 17:00 on .  Pt is s/p fall with a intraparenchymal hemorrhage. Admitted to the ICU for frequently neurological assessments and a nicardipine drip. Pt has been weaned of blood pressure medication. Stable for downgrade to medicine.    Neuro  - CT head intraparenchymal hemorrhage left superior parietal lobe; repeat head CT stable  -Neurology consulted  -MRI brain pending    Cardiac:  -SBP goal <140  -Received 1L IVF bolus for hypotension    Resp  - Chest X Ray blunted right costophrenic angle  -Currently on RA, maintain SpO2>92%    GI  -DASH diet      -Strict intake and output    Heme  -Enoxaparin for DVT prophylaxis    Endo  -Monitor blood glucose on BMP    ID  -Monitor temperature  -Trend WBC    Discussed with ICU intensivist Shelby BUCHANAN and hospitalist    For Follow-Up:  [ ] Neurological recommendations  [ ] Dispo planning

## 2024-04-23 NOTE — OCCUPATIONAL THERAPY INITIAL EVALUATION ADULT - NSOTDISCHREC_GEN_A_CORE
Home, once medically cleared by MD. Pt is Independent in all ADLs and functional transfers./No skilled OT needs

## 2024-04-23 NOTE — OCCUPATIONAL THERAPY INITIAL EVALUATION ADULT - ADDITIONAL COMMENTS
As per pt, he lives within a pvt home with his wife and 2 kids. NO BABS. Inside: 3 steps within the home to access bedroom and bathroom. Pt was Independent in all ADLs and IADL's. Pt utilized no AD for at home or community mobility. As per pt, he lives within a pvt home with his wife and 2 kids. No BABS. Inside: 3 steps within the home to access bedroom and bathroom. Pt was Independent in all ADLs and IADL's. Pt utilized no AD for at home or community mobility.

## 2024-04-24 LAB
ALBUMIN SERPL ELPH-MCNC: 2.8 G/DL — LOW (ref 3.3–5)
ALP SERPL-CCNC: 88 U/L — SIGNIFICANT CHANGE UP (ref 40–120)
ALT FLD-CCNC: 24 U/L — SIGNIFICANT CHANGE UP (ref 12–78)
ANION GAP SERPL CALC-SCNC: 7 MMOL/L — SIGNIFICANT CHANGE UP (ref 5–17)
AST SERPL-CCNC: 17 U/L — SIGNIFICANT CHANGE UP (ref 15–37)
BASOPHILS # BLD AUTO: 0.03 K/UL — SIGNIFICANT CHANGE UP (ref 0–0.2)
BASOPHILS NFR BLD AUTO: 0.5 % — SIGNIFICANT CHANGE UP (ref 0–2)
BILIRUB SERPL-MCNC: 0.4 MG/DL — SIGNIFICANT CHANGE UP (ref 0.2–1.2)
BUN SERPL-MCNC: 25 MG/DL — HIGH (ref 7–23)
CALCIUM SERPL-MCNC: 8.7 MG/DL — SIGNIFICANT CHANGE UP (ref 8.5–10.1)
CHLORIDE SERPL-SCNC: 111 MMOL/L — HIGH (ref 96–108)
CO2 SERPL-SCNC: 24 MMOL/L — SIGNIFICANT CHANGE UP (ref 22–31)
CREAT SERPL-MCNC: 0.73 MG/DL — SIGNIFICANT CHANGE UP (ref 0.5–1.3)
EGFR: 103 ML/MIN/1.73M2 — SIGNIFICANT CHANGE UP
EOSINOPHIL # BLD AUTO: 0.38 K/UL — SIGNIFICANT CHANGE UP (ref 0–0.5)
EOSINOPHIL NFR BLD AUTO: 6 % — SIGNIFICANT CHANGE UP (ref 0–6)
GLUCOSE SERPL-MCNC: 92 MG/DL — SIGNIFICANT CHANGE UP (ref 70–99)
HCT VFR BLD CALC: 43.4 % — SIGNIFICANT CHANGE UP (ref 39–50)
HGB BLD-MCNC: 13.8 G/DL — SIGNIFICANT CHANGE UP (ref 13–17)
IMM GRANULOCYTES NFR BLD AUTO: 0.3 % — SIGNIFICANT CHANGE UP (ref 0–0.9)
LYMPHOCYTES # BLD AUTO: 2.22 K/UL — SIGNIFICANT CHANGE UP (ref 1–3.3)
LYMPHOCYTES # BLD AUTO: 35 % — SIGNIFICANT CHANGE UP (ref 13–44)
MAGNESIUM SERPL-MCNC: 2.1 MG/DL — SIGNIFICANT CHANGE UP (ref 1.6–2.6)
MCHC RBC-ENTMCNC: 28 PG — SIGNIFICANT CHANGE UP (ref 27–34)
MCHC RBC-ENTMCNC: 31.8 G/DL — LOW (ref 32–36)
MCV RBC AUTO: 88.2 FL — SIGNIFICANT CHANGE UP (ref 80–100)
MONOCYTES # BLD AUTO: 0.39 K/UL — SIGNIFICANT CHANGE UP (ref 0–0.9)
MONOCYTES NFR BLD AUTO: 6.1 % — SIGNIFICANT CHANGE UP (ref 2–14)
NEUTROPHILS # BLD AUTO: 3.31 K/UL — SIGNIFICANT CHANGE UP (ref 1.8–7.4)
NEUTROPHILS NFR BLD AUTO: 52.1 % — SIGNIFICANT CHANGE UP (ref 43–77)
NRBC # BLD: 0 /100 WBCS — SIGNIFICANT CHANGE UP (ref 0–0)
PHOSPHATE SERPL-MCNC: 3.4 MG/DL — SIGNIFICANT CHANGE UP (ref 2.5–4.5)
PLATELET # BLD AUTO: 172 K/UL — SIGNIFICANT CHANGE UP (ref 150–400)
POTASSIUM SERPL-MCNC: 4.5 MMOL/L — SIGNIFICANT CHANGE UP (ref 3.5–5.3)
POTASSIUM SERPL-SCNC: 4.5 MMOL/L — SIGNIFICANT CHANGE UP (ref 3.5–5.3)
PROT SERPL-MCNC: 6.7 GM/DL — SIGNIFICANT CHANGE UP (ref 6–8.3)
RBC # BLD: 4.92 M/UL — SIGNIFICANT CHANGE UP (ref 4.2–5.8)
RBC # FLD: 14.7 % — HIGH (ref 10.3–14.5)
SODIUM SERPL-SCNC: 142 MMOL/L — SIGNIFICANT CHANGE UP (ref 135–145)
WBC # BLD: 6.35 K/UL — SIGNIFICANT CHANGE UP (ref 3.8–10.5)
WBC # FLD AUTO: 6.35 K/UL — SIGNIFICANT CHANGE UP (ref 3.8–10.5)

## 2024-04-24 PROCEDURE — 70553 MRI BRAIN STEM W/O & W/DYE: CPT | Mod: 26

## 2024-04-24 PROCEDURE — 99233 SBSQ HOSP IP/OBS HIGH 50: CPT

## 2024-04-24 RX ADMIN — ENOXAPARIN SODIUM 40 MILLIGRAM(S): 100 INJECTION SUBCUTANEOUS at 11:28

## 2024-04-24 RX ADMIN — CHLORHEXIDINE GLUCONATE 1 APPLICATION(S): 213 SOLUTION TOPICAL at 11:28

## 2024-04-24 RX ADMIN — ATORVASTATIN CALCIUM 20 MILLIGRAM(S): 80 TABLET, FILM COATED ORAL at 21:29

## 2024-04-24 NOTE — PROGRESS NOTE ADULT - SUBJECTIVE AND OBJECTIVE BOX
HPI:  Pt is a 64 yo HM with h/o HLD, GERD and BPH presented 2 to acute onset severe dizziness starting around 5pm on  that caused him to fall in the shower. Allegedly max . Pt sent for CT head: 8mm L parietal ICH, 6h scan stable. CTA neg for AVM, aneurysm. ICU dx: L parietal ICH ? hypertensive bleed      ## Labs:  CBC:                        13.8   6.35  )-----------( 172      ( 2024 02:00 )             43.4     Chem:      142  |  111<H>  |  25<H>  ----------------------------<  92  4.5   |  24  |  0.73    Ca    8.7      2024 02:00  Phos  3.4       Mg     2.1         TPro  6.7  /  Alb  2.8<L>  /  TBili  0.4  /  DBili  x   /  AST  17  /  ALT  24  /  AlkPhos  88      Coags:          ## Imaging:    ## Medications:        atorvastatin 20 milliGRAM(s) Oral at bedtime    enoxaparin Injectable 40 milliGRAM(s) SubCutaneous every 24 hours          ## Vitals:  T(C): 36.5 (24 @ 07:25), Max: 36.5 (24 @ 07:25)  HR: 83 (24 @ 11:35) (59 - 98)  BP: 129/84 (24 @ 11:35) (100/64 - 139/85)  BP(mean): 97 (24 @ 11:35) (75 - 106)  RR: 12 (24 @ 11:35) (12 - 38)  SpO2: 98% (24 @ 11:35) (92% - 98%)  Wt(kg): --  Vent:   AB-23 @ 07:01  -   @ 07:00  --------------------------------------------------------  IN: 1400 mL / OUT: 1250 mL / NET: 150 mL     @ 07:01   @ 14:05  --------------------------------------------------------  IN: 0 mL / OUT: 800 mL / NET: -800 mL          ## P/E:  Gen: lying comfortably in bed in no apparent distress  Lungs: CTA  Heart: RRR  Abd: Soft/+BS/ Non-tender  Ext: No edema  Neuro: AAo x3/ No motor or sensory deficits    CENTRAL LINE: [ ] YES [ ] NO  LOCATION:   DATE INSERTED:  REMOVE: [ ] YES [ ] NO      FARIBA: [ ] YES [ ] NO    DATE INSERTED:  REMOVE:  [ ] YES [ ] NO      A-LINE:  [ ] YES [ ] NO  LOCATION:   DATE INSERTED:  REMOVE:  [ ] YES [ ] NO  EXPLAIN:    CODE STATUS: [x ] full code  [ ] DNR  [ ] DNI  [ ] [facility]  Goals of care discussion: [ ] yes

## 2024-04-24 NOTE — PROGRESS NOTE ADULT - ASSESSMENT
Pt is a 64 yo HM with h/o HLD, GERD and BPH presented 2 to acute onset severe dizziness starting around 5pm on 4/21 that caused him to fall in the shower. Allegedly max . Pt sent for CT head: 8mm L parietal ICH, 6h scan stable. CTA neg for AVM, aneurysm. ICU dx: L parietal ICH ? hypertensive bleed    CVS: Avoid extremes of BP/ BP has been transiently low  HEME: DVT prophylaxis with Lovenox  FEN: Avoid hyponatremia/ Po diet  Endo: Goal Glu 120-180  Neuro: F/u official MRI head/ Neuro f/u prn  Social: PT/OT/ May transfer to [facility]

## 2024-04-24 NOTE — CHART NOTE - NSCHARTNOTEFT_GEN_A_CORE
ICU/CCU Transfer Note    Transfer from: ICU/ CCU  Transfer to: medicine   Accepting physician: Dr Delaney  Case discussed with: Dr Valdez    MICU COURSE:  Pt is a 62 yo HM with h/o HLD, GERD and BPH presented 2 to acute onset severe dizziness starting around 5pm on 4/21 that caused him to fall in the shower. Allegedly max . Pt sent for CT head: 8mm L parietal ICH, 6h scan stable. CTA neg for AVM, aneurysm. ICU dx: L parietal ICH ? hypertensive bleed    Patient BP controlled, Completed MRI- reading still pending , stable for transfer to medicine       Occupational Therapy Initial Evaluation Adult (04-23-24 @ 17:12)        ASSESSMENT & PLAN:   ===================  ##Neuro:  -S/P 8mm L parietal   ##PULM:  ##CV:  ##GI:  ##:  ##RENAL:   ##ENDO:atorvastatin 20    ##ID:   ##MSC:     CONSULTS: atorvastatin 20 milliGRAM(s) Oral at bedtime  chlorhexidine 2% Cloths 1 Application(s) Topical daily  enoxaparin Injectable 40 milliGRAM(s) SubCutaneous every 24 hours        For Follow-Up:      VITALS  ========  Vital Signs Last 24 Hrs  T(C): 36.5 (24 Apr 2024 07:25), Max: 36.5 (24 Apr 2024 07:25)  T(F): 97.7 (24 Apr 2024 07:25), Max: 97.7 (24 Apr 2024 07:25)  HR: 83 (24 Apr 2024 11:35) (59 - 98)  BP: 129/84 (24 Apr 2024 11:35) (100/64 - 139/85)  BP(mean): 97 (24 Apr 2024 11:35) (75 - 106)  RR: 12 (24 Apr 2024 11:35) (12 - 38)  SpO2: 98% (24 Apr 2024 11:35) (92% - 98%)    Parameters below as of 24 Apr 2024 07:01  Patient On (Oxygen Delivery Method): room air      I&O's Summary    23 Apr 2024 07:01  -  24 Apr 2024 07:00  --------------------------------------------------------  IN: 1400 mL / OUT: 1250 mL / NET: 150 mL    24 Apr 2024 07:01  -  24 Apr 2024 13:01  --------------------------------------------------------  IN: 0 mL / OUT: 800 mL / NET: -800 mL          LABS                                            13.8                  Neurophils% (auto):   52.1   (04-24 @ 02:00):    6.35 )-----------(172          Lymphocytes% (auto):  35.0                                          43.4                   Eosinphils% (auto):   6.0      Manual%: Neutrophils x    ; Lymphocytes x    ; Eosinophils x    ; Bands%: x    ; Blasts x                                    142    |  111    |  25                  Calcium: 8.7   / iCa: x      (04-24 @ 02:00)    ----------------------------<  92        Magnesium: 2.1                              4.5     |  24     |  0.73             Phosphorous: 3.4      TPro  6.7    /  Alb  2.8    /  TBili  0.4    /  DBili  x      /  AST  17     /  ALT  24     /  AlkPhos  88     24 Apr 2024 02:00 ICU/CCU Transfer Note    Transfer from: ICU/ CCU  Transfer to: medicine   Accepting physician: Dr Delaney  Case discussed with: Dr Valdez    MICU COURSE:  Pt is a 64 yo HM with h/o HLD, GERD and BPH presented 2 to acute onset severe dizziness starting around 5pm on 4/21 that caused him to fall in the shower. Allegedly max . Pt sent for CT head: 8mm L parietal ICH, 6h scan stable. CTA neg for AVM, aneurysm. ICU dx: L parietal ICH ? hypertensive bleed    Patient BP controlled, Completed MRI- reading still pending , stable for transfer to medicine       Occupational Therapy Initial Evaluation Adult (04-23-24 @ 17:12)        ASSESSMENT & PLAN:   ===================  ##Neuro:  -S/P 8mm L parietal  ICH  - follow MRI  results   ##PULM:  - on room air     ##CV:  -hemodynamically stable   -monitor off BP medications   -continue statin     ##GI:  -tolerating PO medications     ##:  -voiding without     ##RENAL:   -monitor and replace electrolytes     ##ENDO:  - monitor blood glucose      ##ID:   - low suspicion for infectious process     ##MSC:   - physical therapy     CONSULTS: atorvastatin 20 milliGRAM(s) Oral at bedtime  chlorhexidine 2% Cloths 1 Application(s) Topical daily  enoxaparin Injectable 40 milliGRAM(s) SubCutaneous every 24 hours        For Follow-Up:      VITALS  ========  Vital Signs Last 24 Hrs  T(C): 36.5 (24 Apr 2024 07:25), Max: 36.5 (24 Apr 2024 07:25)  T(F): 97.7 (24 Apr 2024 07:25), Max: 97.7 (24 Apr 2024 07:25)  HR: 83 (24 Apr 2024 11:35) (59 - 98)  BP: 129/84 (24 Apr 2024 11:35) (100/64 - 139/85)  BP(mean): 97 (24 Apr 2024 11:35) (75 - 106)  RR: 12 (24 Apr 2024 11:35) (12 - 38)  SpO2: 98% (24 Apr 2024 11:35) (92% - 98%)    Parameters below as of 24 Apr 2024 07:01  Patient On (Oxygen Delivery Method): room air      I&O's Summary    23 Apr 2024 07:01  -  24 Apr 2024 07:00  --------------------------------------------------------  IN: 1400 mL / OUT: 1250 mL / NET: 150 mL    24 Apr 2024 07:01  -  24 Apr 2024 13:01  --------------------------------------------------------  IN: 0 mL / OUT: 800 mL / NET: -800 mL          LABS                                            13.8                  Neurophils% (auto):   52.1   (04-24 @ 02:00):    6.35 )-----------(172          Lymphocytes% (auto):  35.0                                          43.4                   Eosinphils% (auto):   6.0      Manual%: Neutrophils x    ; Lymphocytes x    ; Eosinophils x    ; Bands%: x    ; Blasts x                                    142    |  111    |  25                  Calcium: 8.7   / iCa: x      (04-24 @ 02:00)    ----------------------------<  92        Magnesium: 2.1                              4.5     |  24     |  0.73             Phosphorous: 3.4      TPro  6.7    /  Alb  2.8    /  TBili  0.4    /  DBili  x      /  AST  17     /  ALT  24     /  AlkPhos  88     24 Apr 2024 02:00

## 2024-04-25 ENCOUNTER — TRANSCRIPTION ENCOUNTER (OUTPATIENT)
Age: 64
End: 2024-04-25

## 2024-04-25 VITALS
TEMPERATURE: 97 F | RESPIRATION RATE: 26 BRPM | DIASTOLIC BLOOD PRESSURE: 85 MMHG | OXYGEN SATURATION: 95 % | HEART RATE: 71 BPM | SYSTOLIC BLOOD PRESSURE: 125 MMHG

## 2024-04-25 LAB
ALBUMIN SERPL ELPH-MCNC: 2.8 G/DL — LOW (ref 3.3–5)
ALP SERPL-CCNC: 86 U/L — SIGNIFICANT CHANGE UP (ref 40–120)
ALT FLD-CCNC: 27 U/L — SIGNIFICANT CHANGE UP (ref 12–78)
ANION GAP SERPL CALC-SCNC: 4 MMOL/L — LOW (ref 5–17)
AST SERPL-CCNC: 21 U/L — SIGNIFICANT CHANGE UP (ref 15–37)
BILIRUB SERPL-MCNC: 0.4 MG/DL — SIGNIFICANT CHANGE UP (ref 0.2–1.2)
BUN SERPL-MCNC: 29 MG/DL — HIGH (ref 7–23)
CALCIUM SERPL-MCNC: 8.7 MG/DL — SIGNIFICANT CHANGE UP (ref 8.5–10.1)
CHLORIDE SERPL-SCNC: 109 MMOL/L — HIGH (ref 96–108)
CO2 SERPL-SCNC: 26 MMOL/L — SIGNIFICANT CHANGE UP (ref 22–31)
CREAT SERPL-MCNC: 0.84 MG/DL — SIGNIFICANT CHANGE UP (ref 0.5–1.3)
EGFR: 99 ML/MIN/1.73M2 — SIGNIFICANT CHANGE UP
GLUCOSE SERPL-MCNC: 99 MG/DL — SIGNIFICANT CHANGE UP (ref 70–99)
HCT VFR BLD CALC: 43.5 % — SIGNIFICANT CHANGE UP (ref 39–50)
HGB BLD-MCNC: 13.9 G/DL — SIGNIFICANT CHANGE UP (ref 13–17)
MAGNESIUM SERPL-MCNC: 2.1 MG/DL — SIGNIFICANT CHANGE UP (ref 1.6–2.6)
MCHC RBC-ENTMCNC: 28 PG — SIGNIFICANT CHANGE UP (ref 27–34)
MCHC RBC-ENTMCNC: 32 G/DL — SIGNIFICANT CHANGE UP (ref 32–36)
MCV RBC AUTO: 87.5 FL — SIGNIFICANT CHANGE UP (ref 80–100)
NRBC # BLD: 0 /100 WBCS — SIGNIFICANT CHANGE UP (ref 0–0)
PHOSPHATE SERPL-MCNC: 3.5 MG/DL — SIGNIFICANT CHANGE UP (ref 2.5–4.5)
PLATELET # BLD AUTO: 178 K/UL — SIGNIFICANT CHANGE UP (ref 150–400)
POTASSIUM SERPL-MCNC: 4.3 MMOL/L — SIGNIFICANT CHANGE UP (ref 3.5–5.3)
POTASSIUM SERPL-SCNC: 4.3 MMOL/L — SIGNIFICANT CHANGE UP (ref 3.5–5.3)
PROT SERPL-MCNC: 6.9 GM/DL — SIGNIFICANT CHANGE UP (ref 6–8.3)
RBC # BLD: 4.97 M/UL — SIGNIFICANT CHANGE UP (ref 4.2–5.8)
RBC # FLD: 14.6 % — HIGH (ref 10.3–14.5)
SODIUM SERPL-SCNC: 139 MMOL/L — SIGNIFICANT CHANGE UP (ref 135–145)
WBC # BLD: 6.2 K/UL — SIGNIFICANT CHANGE UP (ref 3.8–10.5)
WBC # FLD AUTO: 6.2 K/UL — SIGNIFICANT CHANGE UP (ref 3.8–10.5)

## 2024-04-25 PROCEDURE — 99239 HOSP IP/OBS DSCHRG MGMT >30: CPT

## 2024-04-25 RX ORDER — ATORVASTATIN CALCIUM 80 MG/1
1 TABLET, FILM COATED ORAL
Qty: 0 | Refills: 0 | DISCHARGE
Start: 2024-04-25

## 2024-04-25 RX ORDER — ATORVASTATIN CALCIUM 80 MG/1
1 TABLET, FILM COATED ORAL
Refills: 0 | DISCHARGE

## 2024-04-25 RX ADMIN — ENOXAPARIN SODIUM 40 MILLIGRAM(S): 100 INJECTION SUBCUTANEOUS at 12:48

## 2024-04-25 NOTE — DISCHARGE NOTE PROVIDER - CARE PROVIDER_API CALL
Damián Saez)  Neurosurgery  [address], [address]  [place] [address]  Phone: [phone]  Fax: [phone]  Follow Up Time:    Damián Saez)  Neurosurgery  [address], [address]  [place] [address]  Phone: [phone]  Fax: [phone]  Follow Up Time:     Mabel Shultz  Neurology  [address], [address]  [place] [address]  Phone: [phone]  Fax: [phone]  Follow Up Time:

## 2024-04-25 NOTE — DISCHARGE NOTE PROVIDER - CARE PROVIDERS DIRECT ADDRESSES
,[email or web address]_Unknown

## 2024-04-25 NOTE — DISCHARGE NOTE PROVIDER - HOSPITAL COURSE
Pt is a 62 yo HM with h/o HLD, GERD and BPH presented 2 to acute onset severe dizziness starting around 5pm on 4/21 that caused him to fall in the shower. Allegedly max . Pt sent for CT head: 8mm L parietal ICH, 6h scan stable. CTA neg for AVM, aneurysm. ICU dx: L parietal ICH ? hypertensive bleed. MRI-H w/ L parietal ICH likely due to underlying cavernoma. . patient is medically optimzied for discharge and will need outpatient follow up with neurosurgery.

## 2024-04-25 NOTE — DISCHARGE NOTE PROVIDER - PROVIDER TOKENS
PROVIDER:[TOKEN:[[id number]]] PROVIDER:[TOKEN:[[id number]]],PROVIDER:[TOKEN:[[id number]]]

## 2024-04-25 NOTE — DISCHARGE NOTE PROVIDER - NSDCCPCAREPLAN_GEN_ALL_CORE_FT
PRINCIPAL DISCHARGE DIAGNOSIS  Diagnosis: ICH (intracerebral hemorrhage)  Assessment and Plan of Treatment: Patient with LT parietal ICH likely due to cavernoma.      SECONDARY DISCHARGE DIAGNOSES  Diagnosis: Dizziness  Assessment and Plan of Treatment:

## 2024-04-25 NOTE — PROGRESS NOTE ADULT - ASSESSMENT
62y/o M with PMH of HLD, GERD, BPH presents to VS with acute onset severe dizziness starting around 17:00 on 4/21 that caused him to fall on the shower. No reported hx of HTN but noted to be hypertensive in ED. Not on AC/AP at home  CTH with 8mm L parietal ICH, 6h scan stable   CTA neg for AVM, aneurysm  Exam grossly non-focal  MRI brain ww/o with likely underlying cavernoma     L parietal ICH -would r/o underlying lesion    Plan  - SBP < 140  - no AC/AP  - MRI brain reviewed  - PT/OT  - DVT ppx    Mabel Shultz DO  Vascular Neurology  Office [phone]  Available via [employer] Teams      64y/o M with PMH of HLD, GERD, BPH presents to VS with acute onset severe dizziness starting around 17:00 on 4/21 that caused him to fall on the shower. No reported hx of HTN but noted to be hypertensive in ED. Not on AC/AP at home  CTH with 8mm L parietal ICH, 6h scan stable   CTA neg for AVM, aneurysm  Exam grossly non-focal  MRI brain ww/o with likely underlying cavernoma     L parietal ICH likely due to underlying cavernoma.     Plan  - SBP < 140  - no AC/AP  - MRI brain reviewed  - Outpatient Neurosurgery followup  - monitor clinically for sz  - PT/OT  - DVT ppx    Outpatient followup [phone] on d/c    Mabel Shultz DO  Vascular Neurology  Office [phone]  Available via [employer] Teams

## 2024-04-25 NOTE — PROGRESS NOTE ADULT - SUBJECTIVE AND OBJECTIVE BOX
Neurology Progress Note    S: Patient seen and examined. No new events overnight. patient denied CP, SOB, HA or pain.     Medication:  atorvastatin 20 milliGRAM(s) Oral at bedtime  chlorhexidine 2% Cloths 1 Application(s) Topical daily  enoxaparin Injectable 40 milliGRAM(s) SubCutaneous every 24 hours      Vitals:  Vital Signs Last 24 Hrs  T(C): 36.3 (25 Apr 2024 07:24), Max: 36.4 (25 Apr 2024 04:28)  T(F): 97.3 (25 Apr 2024 07:24), Max: 97.6 (25 Apr 2024 04:28)  HR: 63 (25 Apr 2024 07:00) (59 - 83)  BP: 114/100 (25 Apr 2024 07:00) (84/65 - 177/93)  BP(mean): 107 (25 Apr 2024 07:00) (65 - 119)  RR: 35 (25 Apr 2024 07:00) (12 - 36)  SpO2: 93% (25 Apr 2024 07:00) (90% - 98%)    Parameters below as of 24 Apr 2024 18:20  Patient On (Oxygen Delivery Method): room air        General Exam:   General Appearance: Appropriately dressed and in no acute distress       Head: Normocephalic, atraumatic and no dysmorphic features  Ear, Nose, and Throat: Moist mucous membranes  CVS: S1S2+  Resp: No SOB, no wheeze or rhonchi  Abd: soft NTND  Extremities: No edema, no cyanosis  Skin: No bruises, no rashes     Neurological Exam:  Mental Status: Awake, alert and oriented x 3.  Able to follow simple and complex verbal commands. Able to name and repeat. fluent speech. No obvious aphasia or dysarthria noted.   Cranial Nerves: PERRL, EOMI, VFFC, sensation V1-V3 intact,  no obvious facial asymmetry, equal elevation of palate, scm/trap 5/5, tongue is midline on protrusion. hearing is grossly intact.   Motor: Normal bulk, tone and strength throughout. Fine finger movements were intact and symmetric. no tremors or drift noted.    Sensation: Intact to light touch and pinprick throughout. no right/left confusion. no extinction to tactile on DSS  Reflexes: 1+ throughout at biceps, brachioradialis, triceps, patellars and ankles bilaterally and equal. No clonus. R toe and L toe were both downgoing.  Coordination: No dysmetria on FNF  Gait: deferred    I personally reviewed the below data/images/labs:      CBC Full  -  ( 25 Apr 2024 03:20 )  WBC Count : 6.20 K/uL  RBC Count : 4.97 M/uL  Hemoglobin : 13.9 g/dL  Hematocrit : 43.5 %  Platelet Count - Automated : 178 K/uL  Mean Cell Volume : 87.5 fl  Mean Cell Hemoglobin : 28.0 pg  Mean Cell Hemoglobin Concentration : 32.0 g/dL  Auto Neutrophil # : x  Auto Lymphocyte # : x  Auto Monocyte # : x  Auto Eosinophil # : x  Auto Basophil # : x  Auto Neutrophil % : x  Auto Lymphocyte % : x  Auto Monocyte % : x  Auto Eosinophil % : x  Auto Basophil % : x    04-25    139  |  109<H>  |  29<H>  ----------------------------<  99  4.3   |  26  |  0.84    Ca    8.7      25 Apr 2024 03:20  Phos  3.5     04-25  Mg     2.1     04-25    TPro  6.9  /  Alb  2.8<L>  /  TBili  0.4  /  DBili  x   /  AST  21  /  ALT  27  /  AlkPhos  86  04-25    LIVER FUNCTIONS - ( 25 Apr 2024 03:20 )  Alb: 2.8 g/dL / Pro: 6.9 gm/dL / ALK PHOS: 86 U/L / ALT: 27 U/L / AST: 21 U/L / GGT: x             Urinalysis Basic - ( 25 Apr 2024 03:20 )    Color: x / Appearance: x / SG: x / pH: x  Gluc: 99 mg/dL / Ketone: x  / Bili: x / Urobili: x   Blood: x / Protein: x / Nitrite: x   Leuk Esterase: x / RBC: x / WBC x   Sq Epi: x / Non Sq Epi: x / Bacteria: x        < from: CT Head No Cont (04.21.24 @ 19:24) >    ACC: [id number] EXAM:  CT BRAIN   ORDERED BY: Yecenia Samson     PROCEDURE DATE:  04/21/2024          INTERPRETATION:  CLINICAL INDICATION: head injury sp syncope, dizzines.    TECHNIQUE: CT of the head was performed without the administration of   intravenous contrast.    COMPARISON: None    FINDINGS:  8mm intraparenchymal hemorrhage left superior parietal lobe with mild   surrounding vasogenic edema. No other intracranial hemorrhage is noted.   No acute territorial infarcts. No hydrocephalus or extra-axial fluid   collections. No mass effect or midline shift.    The visualized intraorbital contents are unremarkable. The imaged   portions of the paranasal sinuses are clear. The mastoid air cells are   clear. The visualized soft tissues and osseous structures appear normal.    IMPRESSION:    8mm intraparenchymal hemorrhage left superior parietal lobe. Correlation   with CT angiogram of the brain recommended.    < end of copied text >  < from: CT Angio Head w/ IV Cont (04.21.24 @ 21:37) >    ACC: [id number] EXAM:  CT ANGIO NECK (W)AW IC   ORDERED BY: Joyce Voss     ACC: [id number] EXAM:  CT ANGIO BRAIN (W)AW IC   ORDERED BY: Joyce Voss     PROCEDURE DATE:  04/21/2024          INTERPRETATION:  CLINICAL INDICATION:  Dizziness.    TECHNIQUE: A bolus of IV contrast is administered to acquire a CT   angiogram of the vertebral and carotid arterial vasculature from the   aortic arch to the skull vertex.  90 mL of Omnipaque 350 was injected   intravenously. 10 mL was discarded. MIP images were also obtained.        FINDINGS:      CTA [place]:    ANTERIOR CIRCULATION  ICA  CAVERNOUS, SUPRACLINOID, BIFURCATION SEGMENTS: Patent without flow   limiting stenosis.  ANTERIOR CEREBRAL ARTERIES: Bilateral A1, anterior communicating and A2   anterior cerebral arteries are unremarkable in course and caliber without   flow limiting stenosis.    MIDDLE CEREBRAL ARTERIES: Patent bilateral M1, M2, and distal MCA   branches without flow limiting stenosis.      POSTERIOR CIRCULATION:  VERTEBRAL ARTERIES: Patent without flow limiting stenosis.  BASILAR ARTERY: Patent no flow limiting stenosis.  POSTERIOR CEREBRAL ARTERIES: Patent without flow limiting stenosis.      CTA NECK:  GREAT VESSELS: Visualized segments are patent, no flow limiting stenosis.    COMMON CAROTID ARTERIES:  RIGHT CCA: Patent without flow limiting stenosis  LEFT CCA: Patent without flow limiting stenosis    CAROTID BULBS:  RIGHT CB: Patent without flow limiting stenosis  LEFT CB: Patent without flow limiting stenosis    INTERNAL CAROTID ARTERIES:  RIGHT ICA: Patent no evidence for any hemodynamically significant   stenosis at the ICA origin by NASCET criteria.  LEFT ICA: Patent no evidence for any hemodynamically significant stenosis   at the ICA origin by NASCET criteria.    VERTEBRAL ARTERIES:  RIGHT VA: Patent no evidence for any flow limiting stenosis.  LEFT VA: Patent no evidence for any flow limiting stenosis.      SOFT TISSUES: Unremarkable  BONES: Unremarkable      IMPRESSION:    CTA NECK:  No significant stenosis of the cervical carotid or vertebral   arteries.    CTA HEAD:  No significant stenosis or occlusion of the major proximal   branches of the [place] of [place].    --- End of Report ---      < end of copied text >  < from: CT Head No Cont (04.22.24 @ 01:15) >    ACC: [id number] EXAM:  CT BRAIN   ORDERED BY: Joyce Voss     PROCEDURE DATE:  04/22/2024          INTERPRETATION:  HEAD CT WITHOUT CONTRAST dated 4/22/2024 1:14 AM    HISTORY: repeat CT r/o worsening hemorrhage.    TECHNIQUE: Head CT was performedwithout intravenous contrast. Axial,   sagittal, and coronal images were obtained.    COMPARISON: Head CT 4/21/2024 7:25 PM. CTA of the head and neck 4/21/2024.    FINDINGS:    The 1.7 x 1.0 x 0.8 cm hyperdense lesion within the left parietal   subcortical white matter does not appear substantially changed. There is   again minimal hypodensity partially surrounding this lesion which may   represent gliosis or vasogenic edema. No significant enhancement was seen   in this lesion on the earlier CTA.    The parenchymal volume is normal for patient's age. No new hemorrhage,   acute transcortical infarction, mass effect, or hydrocephalus is   identified.    The calvarium is intact. The visualized paranasal sinuses and mastoid air   cells are predominantly clear.    IMPRESSION:    No substantial change in the hyperdense lesion within the left parietal   subcortical white matter since the previous day. This may represent   hemorrhage, cavernous malformation, or other hemorrhagic lesion. Consider  further evaluation with contrast-enhanced brain MRI.    --- End of Report ---    < end of copied text >      ACC: [id number] EXAM:  MR BRAIN WAW IC   ORDERED BY: Montana Sauceda     PROCEDURE DATE:  04/24/2024          INTERPRETATION:  CLINICAL INDICATION: Evaluate for intracranial   hemorrhage.    TECHNIQUE: Multi-planar multi-sequential MR imaging of the brain was   performed before and after the intravenous administration of contrast.   Gadavist IV contrast dose: 7.5 cc administered.    COMPARISON: CT head 4/22/2024.    FINDINGS:  There is a T2 hyperintense, enhancing lesion in the left parietal lobe   posterior to the postcentral gyrus measuring 0.8 x 1.2 x 1.3 cm (AP by   transverse by craniocaudal). There is extensive associated susceptibility   related signal loss, including a T2 hypointense rim. There is no   surrounding edema.    No acute infarct is identified. Minimal FLAIR hyperintense white matter   foci are noted, most compatible with chronic small vessel disease.    No hydrocephalus. No extra-axial fluid collections. The skull base flow   voids are present.    The visualized intraorbital contents are unremarkable. Trace scattered   mucosal thickening in the ethmoid air cells. The mastoid air cells are   clear. The visualized osseous structures, soft tissues and partially   visualized parotid glands appear normal.    IMPRESSION:    1.3 cm enhancing lesion in the left parietal lobe, most compatible with a   cavernous malformation. Treated metastasis can also be considered if this   matches with patient's history. There is no surrounding edema to suggest   recent lesion hemorrhage.     Neurology Progress Note    S: Patient seen and examined. No new events overnight.     Medication:  atorvastatin 20 milliGRAM(s) Oral at bedtime  chlorhexidine 2% Cloths 1 Application(s) Topical daily  enoxaparin Injectable 40 milliGRAM(s) SubCutaneous every 24 hours      Vitals:  Vital Signs Last 24 Hrs  T(C): 36.3 (25 Apr 2024 07:24), Max: 36.4 (25 Apr 2024 04:28)  T(F): 97.3 (25 Apr 2024 07:24), Max: 97.6 (25 Apr 2024 04:28)  HR: 63 (25 Apr 2024 07:00) (59 - 83)  BP: 114/100 (25 Apr 2024 07:00) (84/65 - 177/93)  BP(mean): 107 (25 Apr 2024 07:00) (65 - 119)  RR: 35 (25 Apr 2024 07:00) (12 - 36)  SpO2: 93% (25 Apr 2024 07:00) (90% - 98%)    Parameters below as of 24 Apr 2024 18:20  Patient On (Oxygen Delivery Method): room air        General Exam:   General Appearance: Appropriately dressed and in no acute distress       Head: Normocephalic, atraumatic and no dysmorphic features  Ear, Nose, and Throat: Moist mucous membranes  CVS: S1S2+  Resp: No SOB, no wheeze or rhonchi  Abd: soft NTND  Extremities: No edema, no cyanosis  Skin: No bruises, no rashes     Neurological Exam:  Mental Status: Awake, alert and oriented x 3.  Able to follow simple and complex verbal commands. Able to name and repeat. fluent speech. No obvious aphasia or dysarthria noted.   Cranial Nerves: PERRL, EOMI, VFFC, sensation V1-V3 intact,  no obvious facial asymmetry, equal elevation of palate, scm/trap 5/5, tongue is midline on protrusion. hearing is grossly intact.   Motor: Normal bulk, tone and strength throughout. Fine finger movements were intact and symmetric. no tremors or drift noted.    Sensation: Intact to light touch and pinprick throughout. no right/left confusion. no extinction to tactile on DSS  Reflexes: 1+ throughout at biceps, brachioradialis, triceps, patellars and ankles bilaterally and equal. No clonus. R toe and L toe were both downgoing.  Coordination: No dysmetria on FNF  Gait: deferred    I personally reviewed the below data/images/labs:      CBC Full  -  ( 25 Apr 2024 03:20 )  WBC Count : 6.20 K/uL  RBC Count : 4.97 M/uL  Hemoglobin : 13.9 g/dL  Hematocrit : 43.5 %  Platelet Count - Automated : 178 K/uL  Mean Cell Volume : 87.5 fl  Mean Cell Hemoglobin : 28.0 pg  Mean Cell Hemoglobin Concentration : 32.0 g/dL  Auto Neutrophil # : x  Auto Lymphocyte # : x  Auto Monocyte # : x  Auto Eosinophil # : x  Auto Basophil # : x  Auto Neutrophil % : x  Auto Lymphocyte % : x  Auto Monocyte % : x  Auto Eosinophil % : x  Auto Basophil % : x    04-25    139  |  109<H>  |  29<H>  ----------------------------<  99  4.3   |  26  |  0.84    Ca    8.7      25 Apr 2024 03:20  Phos  3.5     04-25  Mg     2.1     04-25    TPro  6.9  /  Alb  2.8<L>  /  TBili  0.4  /  DBili  x   /  AST  21  /  ALT  27  /  AlkPhos  86  04-25    LIVER FUNCTIONS - ( 25 Apr 2024 03:20 )  Alb: 2.8 g/dL / Pro: 6.9 gm/dL / ALK PHOS: 86 U/L / ALT: 27 U/L / AST: 21 U/L / GGT: x             Urinalysis Basic - ( 25 Apr 2024 03:20 )    Color: x / Appearance: x / SG: x / pH: x  Gluc: 99 mg/dL / Ketone: x  / Bili: x / Urobili: x   Blood: x / Protein: x / Nitrite: x   Leuk Esterase: x / RBC: x / WBC x   Sq Epi: x / Non Sq Epi: x / Bacteria: x        < from: CT Head No Cont (04.21.24 @ 19:24) >    ACC: [id number] EXAM:  CT BRAIN   ORDERED BY: Yecenia Samson     PROCEDURE DATE:  04/21/2024          INTERPRETATION:  CLINICAL INDICATION: head injury sp syncope, dizzines.    TECHNIQUE: CT of the head was performed without the administration of   intravenous contrast.    COMPARISON: None    FINDINGS:  8mm intraparenchymal hemorrhage left superior parietal lobe with mild   surrounding vasogenic edema. No other intracranial hemorrhage is noted.   No acute territorial infarcts. No hydrocephalus or extra-axial fluid   collections. No mass effect or midline shift.    The visualized intraorbital contents are unremarkable. The imaged   portions of the paranasal sinuses are clear. The mastoid air cells are   clear. The visualized soft tissues and osseous structures appear normal.    IMPRESSION:    8mm intraparenchymal hemorrhage left superior parietal lobe. Correlation   with CT angiogram of the brain recommended.    < end of copied text >  < from: CT Angio Head w/ IV Cont (04.21.24 @ 21:37) >    ACC: [id number] EXAM:  CT ANGIO NECK (W)AW IC   ORDERED BY: Joyce Voss     ACC: [id number] EXAM:  CT ANGIO BRAIN (W)AW IC   ORDERED BY: Joyce Voss     PROCEDURE DATE:  04/21/2024          INTERPRETATION:  CLINICAL INDICATION:  Dizziness.    TECHNIQUE: A bolus of IV contrast is administered to acquire a CT   angiogram of the vertebral and carotid arterial vasculature from the   aortic arch to the skull vertex.  90 mL of Omnipaque 350 was injected   intravenously. 10 mL was discarded. MIP images were also obtained.        FINDINGS:      CTA [place]:    ANTERIOR CIRCULATION  ICA  CAVERNOUS, SUPRACLINOID, BIFURCATION SEGMENTS: Patent without flow   limiting stenosis.  ANTERIOR CEREBRAL ARTERIES: Bilateral A1, anterior communicating and A2   anterior cerebral arteries are unremarkable in course and caliber without   flow limiting stenosis.    MIDDLE CEREBRAL ARTERIES: Patent bilateral M1, M2, and distal MCA   branches without flow limiting stenosis.      POSTERIOR CIRCULATION:  VERTEBRAL ARTERIES: Patent without flow limiting stenosis.  BASILAR ARTERY: Patent no flow limiting stenosis.  POSTERIOR CEREBRAL ARTERIES: Patent without flow limiting stenosis.      CTA NECK:  GREAT VESSELS: Visualized segments are patent, no flow limiting stenosis.    COMMON CAROTID ARTERIES:  RIGHT CCA: Patent without flow limiting stenosis  LEFT CCA: Patent without flow limiting stenosis    CAROTID BULBS:  RIGHT CB: Patent without flow limiting stenosis  LEFT CB: Patent without flow limiting stenosis    INTERNAL CAROTID ARTERIES:  RIGHT ICA: Patent no evidence for any hemodynamically significant   stenosis at the ICA origin by NASCET criteria.  LEFT ICA: Patent no evidence for any hemodynamically significant stenosis   at the ICA origin by NASCET criteria.    VERTEBRAL ARTERIES:  RIGHT VA: Patent no evidence for any flow limiting stenosis.  LEFT VA: Patent no evidence for any flow limiting stenosis.      SOFT TISSUES: Unremarkable  BONES: Unremarkable      IMPRESSION:    CTA NECK:  No significant stenosis of the cervical carotid or vertebral   arteries.    CTA HEAD:  No significant stenosis or occlusion of the major proximal   branches of the [place] of [place].    --- End of Report ---      < end of copied text >  < from: CT Head No Cont (04.22.24 @ 01:15) >    ACC: [id number] EXAM:  CT BRAIN   ORDERED BY: Joyce Voss     PROCEDURE DATE:  04/22/2024          INTERPRETATION:  HEAD CT WITHOUT CONTRAST dated 4/22/2024 1:14 AM    HISTORY: repeat CT r/o worsening hemorrhage.    TECHNIQUE: Head CT was performedwithout intravenous contrast. Axial,   sagittal, and coronal images were obtained.    COMPARISON: Head CT 4/21/2024 7:25 PM. CTA of the head and neck 4/21/2024.    FINDINGS:    The 1.7 x 1.0 x 0.8 cm hyperdense lesion within the left parietal   subcortical white matter does not appear substantially changed. There is   again minimal hypodensity partially surrounding this lesion which may   represent gliosis or vasogenic edema. No significant enhancement was seen   in this lesion on the earlier CTA.    The parenchymal volume is normal for patient's age. No new hemorrhage,   acute transcortical infarction, mass effect, or hydrocephalus is   identified.    The calvarium is intact. The visualized paranasal sinuses and mastoid air   cells are predominantly clear.    IMPRESSION:    No substantial change in the hyperdense lesion within the left parietal   subcortical white matter since the previous day. This may represent   hemorrhage, cavernous malformation, or other hemorrhagic lesion. Consider  further evaluation with contrast-enhanced brain MRI.    --- End of Report ---    < end of copied text >      ACC: [id number] EXAM:  MR BRAIN WAW IC   ORDERED BY: Montana Sauceda     PROCEDURE DATE:  04/24/2024          INTERPRETATION:  CLINICAL INDICATION: Evaluate for intracranial   hemorrhage.    TECHNIQUE: Multi-planar multi-sequential MR imaging of the brain was   performed before and after the intravenous administration of contrast.   Gadavist IV contrast dose: 7.5 cc administered.    COMPARISON: CT head 4/22/2024.    FINDINGS:  There is a T2 hyperintense, enhancing lesion in the left parietal lobe   posterior to the postcentral gyrus measuring 0.8 x 1.2 x 1.3 cm (AP by   transverse by craniocaudal). There is extensive associated susceptibility   related signal loss, including a T2 hypointense rim. There is no   surrounding edema.    No acute infarct is identified. Minimal FLAIR hyperintense white matter   foci are noted, most compatible with chronic small vessel disease.    No hydrocephalus. No extra-axial fluid collections. The skull base flow   voids are present.    The visualized intraorbital contents are unremarkable. Trace scattered   mucosal thickening in the ethmoid air cells. The mastoid air cells are   clear. The visualized osseous structures, soft tissues and partially   visualized parotid glands appear normal.    IMPRESSION:    1.3 cm enhancing lesion in the left parietal lobe, most compatible with a   cavernous malformation. Treated metastasis can also be considered if this   matches with patient's history. There is no surrounding edema to suggest   recent lesion hemorrhage.

## 2024-04-25 NOTE — DISCHARGE NOTE NURSING/CASE MANAGEMENT/SOCIAL WORK - PATIENT PORTAL LINK FT
You can access the FollowMyHealth Patient Portal offered by [facility] by registering at the following website: [email or web address]. By joining [employer]’s FollowMyHealth portal, you will also be able to view your health information using other applications (apps) compatible with our system.

## 2024-04-25 NOTE — DISCHARGE NOTE NURSING/CASE MANAGEMENT/SOCIAL WORK - NSDCPEFALRISK_GEN_ALL_CORE
For information on Fall & Injury Prevention, visit: [email or web address] OR  [email or web address] OR  https://www.cdc.gov/steadi/patient.html

## 2024-04-25 NOTE — DISCHARGE NOTE PROVIDER - NSDCMRMEDTOKEN_GEN_ALL_CORE_FT
atorvastatin 20 mg oral tablet: 1 tab(s) orally once a day (at bedtime)  Pepcid 20 mg oral tablet: 1 tab(s) orally 2 times a day as needed for  indigestion

## 2024-04-25 NOTE — DISCHARGE NOTE PROVIDER - ATTENDING DISCHARGE PHYSICAL EXAMINATION:
Vital Signs Last 24 Hrs  T(F): 97.3 (25 Apr 2024 07:24), Max: 97.6 (25 Apr 2024 04:28)  HR: 70 (25 Apr 2024 08:00) (59 - 82)  BP: 112/75 (25 Apr 2024 08:00) (84/65 - 177/93)  RR: 22 (25 Apr 2024 08:00) (12 - 36)  SpO2: 94% (25 Apr 2024 08:00) (90% - 96%)    Physical Exam:  Constitutional: NAD, awake and alert,  Respiratory: cta b/l no wheezing no rhonchi  Cardiovascular: +s1/s2 no edema b/l le  Gastrointestinal: soft nt nd bs+  Vascular: 2+ peripheral pulses  Neurological: A/O x 3, no focal deficits

## 2024-04-30 DIAGNOSIS — K21.9 GASTRO-ESOPHAGEAL REFLUX DISEASE WITHOUT ESOPHAGITIS: ICD-10-CM

## 2024-04-30 DIAGNOSIS — N40.0 BENIGN PROSTATIC HYPERPLASIA WITHOUT LOWER URINARY TRACT SYMPTOMS: ICD-10-CM

## 2024-04-30 DIAGNOSIS — R42 DIZZINESS AND GIDDINESS: ICD-10-CM

## 2024-04-30 DIAGNOSIS — D18.02 HEMANGIOMA OF INTRACRANIAL STRUCTURES: ICD-10-CM

## 2024-04-30 DIAGNOSIS — E78.5 HYPERLIPIDEMIA, UNSPECIFIED: ICD-10-CM

## 2024-04-30 DIAGNOSIS — I62.9 NONTRAUMATIC INTRACRANIAL HEMORRHAGE, UNSPECIFIED: ICD-10-CM

## 2024-04-30 DIAGNOSIS — I10 ESSENTIAL (PRIMARY) HYPERTENSION: ICD-10-CM

## 2024-07-02 PROBLEM — E78.5 HYPERLIPIDEMIA, UNSPECIFIED: Chronic | Status: ACTIVE | Noted: 2024-04-22

## 2024-07-17 ENCOUNTER — OUTPATIENT (OUTPATIENT)
Dept: OUTPATIENT SERVICES | Facility: HOSPITAL | Age: 64
LOS: 1 days | End: 2024-07-17
Payer: MEDICAID

## 2024-07-17 ENCOUNTER — APPOINTMENT (OUTPATIENT)
Dept: MRI IMAGING | Facility: CLINIC | Age: 64
End: 2024-07-17
Payer: MEDICAID

## 2024-07-17 DIAGNOSIS — Z00.00 ENCOUNTER FOR GENERAL ADULT MEDICAL EXAMINATION WITHOUT ABNORMAL FINDINGS: ICD-10-CM

## 2024-07-17 PROCEDURE — 70553 MRI BRAIN STEM W/O & W/DYE: CPT

## 2024-07-17 PROCEDURE — A9585: CPT

## 2024-07-17 PROCEDURE — 70553 MRI BRAIN STEM W/O & W/DYE: CPT | Mod: 26

## 2024-09-24 ENCOUNTER — APPOINTMENT (OUTPATIENT)
Dept: OPHTHALMOLOGY | Facility: CLINIC | Age: 64
End: 2024-09-24

## 2024-10-24 ENCOUNTER — EMERGENCY (EMERGENCY)
Facility: HOSPITAL | Age: 64
LOS: 0 days | Discharge: HOME | End: 2024-10-24
Attending: STUDENT IN AN ORGANIZED HEALTH CARE EDUCATION/TRAINING PROGRAM
Payer: MEDICAID

## 2024-10-24 VITALS
HEART RATE: 67 BPM | RESPIRATION RATE: 19 BRPM | DIASTOLIC BLOOD PRESSURE: 83 MMHG | TEMPERATURE: 98 F | WEIGHT: 190.04 LBS | OXYGEN SATURATION: 96 % | HEIGHT: 65 IN | SYSTOLIC BLOOD PRESSURE: 134 MMHG

## 2024-10-24 VITALS
SYSTOLIC BLOOD PRESSURE: 191 MMHG | RESPIRATION RATE: 18 BRPM | HEART RATE: 71 BPM | OXYGEN SATURATION: 97 % | DIASTOLIC BLOOD PRESSURE: 96 MMHG | TEMPERATURE: 98 F

## 2024-10-24 DIAGNOSIS — R42 DIZZINESS AND GIDDINESS: ICD-10-CM
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
## 2024-10-24 DIAGNOSIS — E78.5 HYPERLIPIDEMIA, UNSPECIFIED: ICD-10-CM

## 2024-10-24 DIAGNOSIS — M54.50 LOW BACK PAIN, UNSPECIFIED: ICD-10-CM

## 2024-10-24 LAB
ALBUMIN SERPL ELPH-MCNC: 3.6 G/DL — SIGNIFICANT CHANGE UP (ref 3.3–5)
ALP SERPL-CCNC: 98 U/L — SIGNIFICANT CHANGE UP (ref 40–120)
ALT FLD-CCNC: 34 U/L — SIGNIFICANT CHANGE UP (ref 12–78)
ANION GAP SERPL CALC-SCNC: 7 MMOL/L — SIGNIFICANT CHANGE UP (ref 5–17)
AST SERPL-CCNC: 24 U/L — SIGNIFICANT CHANGE UP (ref 15–37)
BASOPHILS # BLD AUTO: 0.03 K/UL — SIGNIFICANT CHANGE UP (ref 0–0.2)
BASOPHILS NFR BLD AUTO: 0.4 % — SIGNIFICANT CHANGE UP (ref 0–2)
BILIRUB SERPL-MCNC: 0.8 MG/DL — SIGNIFICANT CHANGE UP (ref 0.2–1.2)
BUN SERPL-MCNC: 17 MG/DL — SIGNIFICANT CHANGE UP (ref 7–23)
CALCIUM SERPL-MCNC: 9.1 MG/DL — SIGNIFICANT CHANGE UP (ref 8.5–10.1)
CHLORIDE SERPL-SCNC: 109 MMOL/L — HIGH (ref 96–108)
CO2 SERPL-SCNC: 27 MMOL/L — SIGNIFICANT CHANGE UP (ref 22–31)
CREAT SERPL-MCNC: 0.76 MG/DL — SIGNIFICANT CHANGE UP (ref 0.5–1.3)
EGFR: 101 ML/MIN/1.73M2 — SIGNIFICANT CHANGE UP
EOSINOPHIL # BLD AUTO: 0.13 K/UL — SIGNIFICANT CHANGE UP (ref 0–0.5)
EOSINOPHIL NFR BLD AUTO: 1.8 % — SIGNIFICANT CHANGE UP (ref 0–6)
GLUCOSE SERPL-MCNC: 101 MG/DL — HIGH (ref 70–99)
HCT VFR BLD CALC: 46.6 % — SIGNIFICANT CHANGE UP (ref 39–50)
HGB BLD-MCNC: 15.2 G/DL — SIGNIFICANT CHANGE UP (ref 13–17)
IMM GRANULOCYTES NFR BLD AUTO: 0.4 % — SIGNIFICANT CHANGE UP (ref 0–0.9)
LYMPHOCYTES # BLD AUTO: 1.39 K/UL — SIGNIFICANT CHANGE UP (ref 1–3.3)
LYMPHOCYTES # BLD AUTO: 18.9 % — SIGNIFICANT CHANGE UP (ref 13–44)
MCHC RBC-ENTMCNC: 28.4 PG — SIGNIFICANT CHANGE UP (ref 27–34)
MCHC RBC-ENTMCNC: 32.6 G/DL — SIGNIFICANT CHANGE UP (ref 32–36)
MCV RBC AUTO: 87.1 FL — SIGNIFICANT CHANGE UP (ref 80–100)
MONOCYTES # BLD AUTO: 0.47 K/UL — SIGNIFICANT CHANGE UP (ref 0–0.9)
MONOCYTES NFR BLD AUTO: 6.4 % — SIGNIFICANT CHANGE UP (ref 2–14)
NEUTROPHILS # BLD AUTO: 5.29 K/UL — SIGNIFICANT CHANGE UP (ref 1.8–7.4)
NEUTROPHILS NFR BLD AUTO: 72.1 % — SIGNIFICANT CHANGE UP (ref 43–77)
NRBC # BLD: 0 /100 WBCS — SIGNIFICANT CHANGE UP (ref 0–0)
PLATELET # BLD AUTO: 176 K/UL — SIGNIFICANT CHANGE UP (ref 150–400)
POTASSIUM SERPL-MCNC: 4.5 MMOL/L — SIGNIFICANT CHANGE UP (ref 3.5–5.3)
POTASSIUM SERPL-SCNC: 4.5 MMOL/L — SIGNIFICANT CHANGE UP (ref 3.5–5.3)
PROT SERPL-MCNC: 7.7 GM/DL — SIGNIFICANT CHANGE UP (ref 6–8.3)
RBC # BLD: 5.35 M/UL — SIGNIFICANT CHANGE UP (ref 4.2–5.8)
RBC # FLD: 14.9 % — HIGH (ref 10.3–14.5)
SODIUM SERPL-SCNC: 143 MMOL/L — SIGNIFICANT CHANGE UP (ref 135–145)
WBC # BLD: 7.34 K/UL — SIGNIFICANT CHANGE UP (ref 3.8–10.5)
WBC # FLD AUTO: 7.34 K/UL — SIGNIFICANT CHANGE UP (ref 3.8–10.5)

## 2024-10-24 PROCEDURE — 99284 EMERGENCY DEPT VISIT MOD MDM: CPT

## 2024-10-24 PROCEDURE — 72100 X-RAY EXAM L-S SPINE 2/3 VWS: CPT | Mod: 26

## 2024-10-24 PROCEDURE — 93010 ELECTROCARDIOGRAM REPORT: CPT

## 2024-10-24 RX ORDER — LIDOCAINE 50 MG/G
1 CREAM TOPICAL ONCE
Refills: 0 | Status: COMPLETED | OUTPATIENT
Start: 2024-10-24 | End: 2024-10-24

## 2024-10-24 RX ORDER — SODIUM CHLORIDE 0.9 % (FLUSH) 0.9 %
1000 SYRINGE (ML) INJECTION ONCE
Refills: 0 | Status: COMPLETED | OUTPATIENT
Start: 2024-10-24 | End: 2024-10-24

## 2024-10-24 RX ORDER — KETOROLAC TROMETHAMINE 10 MG/1
30 TABLET, FILM COATED ORAL ONCE
Refills: 0 | Status: DISCONTINUED | OUTPATIENT
Start: 2024-10-24 | End: 2024-10-24

## 2024-10-24 RX ORDER — CYCLOBENZAPRINE HCL 10 MG
5 TABLET ORAL ONCE
Refills: 0 | Status: COMPLETED | OUTPATIENT
Start: 2024-10-24 | End: 2024-10-24

## 2024-10-24 RX ORDER — LIDOCAINE 50 MG/G
1 CREAM TOPICAL
Qty: 2 | Refills: 0
Start: 2024-10-24 | End: 2024-11-02

## 2024-10-24 RX ORDER — CYCLOBENZAPRINE HCL 10 MG
1 TABLET ORAL
Qty: 15 | Refills: 0
Start: 2024-10-24 | End: 2024-10-28

## 2024-10-24 RX ORDER — ACETAMINOPHEN 325 MG
650 TABLET ORAL ONCE
Refills: 0 | Status: DISCONTINUED | OUTPATIENT
Start: 2024-10-24 | End: 2024-10-24

## 2024-10-24 RX ADMIN — KETOROLAC TROMETHAMINE 30 MILLIGRAM(S): 10 TABLET, FILM COATED ORAL at 15:58

## 2024-10-24 RX ADMIN — LIDOCAINE 1 PATCH: 50 CREAM TOPICAL at 15:58

## 2024-10-24 RX ADMIN — Medication 1000 MILLILITER(S): at 15:57

## 2024-10-24 RX ADMIN — Medication 5 MILLIGRAM(S): at 15:57

## 2024-10-24 NOTE — ED ADULT NURSE NOTE - NSFALLRISKINTERV_ED_ALL_ED

## 2024-10-24 NOTE — ED PROVIDER NOTE - PROGRESS NOTE DETAILS
Samson DO: pt feels better, no emergent findings on lab/ imaging, reassessed and pt stable for dc, strict return precautions and outpt follow up discussed

## 2024-10-24 NOTE — ED PROVIDER NOTE - CLINICAL SUMMARY MEDICAL DECISION MAKING FREE TEXT BOX
64M pmhx HLD, L parietal ICH due to underlying cavernoma dx April 2024 pt reports occurred after fall, who presents for eval of low back pain after episode of dizziness this morning at approx 6am, states he took tylenol at about 630am but still has low back pain which prompted him to come in. Denies  symptoms/changes. Denies Leg weakness/ numbness. Denies chest pain, sob, abd pain. Denies active dizziness - No focal neuro deficits.   Will check labs to assess for anemia/ metabolic derangements, no signs suggestive of acute cord injury/cauda equina, pt denies head injury or LOC today. Will check screening ekg, xray lumbar spine to r./o fracture, analgesia and reassess. 64M pmhx HLD, L parietal ICH due to underlying cavernoma dx April 2024 pt reports occurred after fall, who presents for eval of low back pain after episode of dizziness this morning at approx 6am, states he took tylenol at about 630am but still has low back pain which prompted him to come in. Denies  symptoms/changes. Denies Leg weakness/ numbness. Denies chest pain, sob, abd pain. Denies head injury. Denies headache. Denies active dizziness - No focal neuro deficits. reports ongoing outpt f/u with neuro and ENT for dizziness and states meclizine makes him feel worse - no new dizziness   Will check labs to assess for anemia/ metabolic derangements, no signs suggestive of acute cord injury/cauda equina, pt denies head injury or LOC today. Will check screening ekg, xray lumbar spine to r./o fracture, analgesia and reassess.

## 2024-10-24 NOTE — ED ADULT TRIAGE NOTE - CHIEF COMPLAINT QUOTE
Patient presents to ED c/o syncopal episode and fell injuring lower back. Patient took Tylenol with no relief. Patient is noncompliant with meclizine.   hx HLD

## 2024-10-24 NOTE — ED PROVIDER NOTE - CARE PROVIDER_API CALL
Chi Curry  Neurology  [address], [address]  [place] [address]  Phone: [phone]  Fax: [phone]  Follow Up Time:     Yvette Kruger  Orthopaedic Surgery  [address], [address]  [place] [address]  Phone: [phone]  Fax: [phone]  Follow Up Time:

## 2024-10-24 NOTE — ED PROVIDER NOTE - NSFOLLOWUPINSTRUCTIONS_ED_ALL_ED_FT
You were seen today after dizziness resulted in back injury - we performed xray of the lumbar spine and did not see any fracture - official radiology report will come back in 24 hours and we will call you if it is abnormal or if we missed anything    Follow up with your neurologist regarding ongoing episodes of dizziness    For back pain - follow up with spine specialist if back pain persists    For pain control - can take  Take ibuprofen 400 mg every 8 hours as needed for pain with food and plenty of water for up to 5 days  Take tylenol 650 mg every 6 hours as needed for pain  Use local lidocaine patch for 12 hours on and 12 hours off daily to avoid skin irritation   Take cyclobenzaprine 5mg every 8 hours as needed for muscle relaxant ( can cause dizziness)     Follow up with your primary doctor in 1-2 days    Return to the emergency department for blood in urine, worsening loss of bladder control/loss of bowel control, fever, vomiting, inability to walk, weakness/numbness, or if you have any new or changing symptoms or concerns.      Dizziness    Dizziness can manifest as a feeling of unsteadiness or light-headedness. You may feel like you are about to faint. This condition can be caused by a number of things, including medicines, dehydration, or illness. Drink enough fluid to keep your urine clear or pale yellow. Do not drink alcohol and limit your caffeine intake. Avoid quick or sudden movements.  Rise slowly from chairs and steady yourself until you feel okay. In the morning, first sit up on the side of the bed.    SEEK IMMEDIATE MEDICAL CARE IF YOU HAVE ANY OF THE FOLLOWING SYMPTOMS: vomiting, changes in your vision or speech, weakness in your arms or legs, trouble speaking or swallowing, chest pain, abdominal pain, shortness of breath, sweating, bleeding, headache, neck pain, or fever.    Back Pain    Back pain is very common in adults. The cause of back pain is rarely dangerous and the pain often gets better over time. The cause of your back pain may not be known and may include strain of muscles or ligaments, degeneration of the spinal disks, or arthritis. Occasionally the pain may radiate down your leg(s). Over-the-counter medicines to reduce pain and inflammation are often the most helpful. Stretching and remaining active frequently helps the healing process.     SEEK IMMEDIATE MEDICAL CARE IF YOU HAVE ANY OF THE FOLLOWING SYMPTOMS: bowel or bladder control problems, unusual weakness or numbness in your arms or legs, nausea or vomiting, abdominal pain, fever, dizziness/lightheadedness.

## 2024-10-24 NOTE — ED ADULT NURSE REASSESSMENT NOTE - NS ED NURSE REASSESS COMMENT FT1
Pt denies headache, cp, sob, dizziness, palpitations or any other s/s at this time. Dr. Samson notified of pt's elevated BP, cleared for DC home by Dr. Samson due to pt being asymptomatic despite 1 time elevated BP. Pt's BP in triage 134/83 and he denies any hx of HTN. Pt admits to feeling anxious now and just wanting to go home. Pt instructed to followup with his PMD regarding his BP. Dr. Samson came to bedside to evaluate pt and states pt is okay to go home. Return precautions reviewed. Pt verbalized understanding.

## 2024-10-24 NOTE — ED ADULT NURSE NOTE - OBJECTIVE STATEMENT
Pt c/o dizziness since 6am this morning, pt stated he fall backwards, and hit lower back, c/o pain 9/10. pt denies passing out ot hitting head.   h/o: hld,

## 2024-10-24 NOTE — ED PROVIDER NOTE - PHYSICAL EXAMINATION
Gen: aox3, nad,   Head: NCAT  ENT: Airway patent, moist mucous membranes, nasal passageways clear, EOMI , eyes PERRL   Cardiac: Normal rate, normal rhythm, no murmurs   Respiratory: Lungs CTA B/L  Gastrointestinal: Abdomen soft, nontender, nondistended, no rebound, no guarding  MSK: No gross abnormalities, FROM of all four extremities, + mild ttp lumbar spine   HEME: Extremities warm and well perfused   Skin: No rashes, no lesions  Neuro: No gross neurologic deficits, CN II-XII intact, no facial asymmetry, no dysarthria, no sensory deficits, strength equal in all four extremities, no gait abnormality

## 2024-10-24 NOTE — ED PROVIDER NOTE - PATIENT PORTAL LINK FT
You can access the FollowMyHealth Patient Portal offered by [facility] by registering at the following website: [email or web address]. By joining [facility]’s FollowMyHealth portal, you will also be able to view your health information using other applications (apps) compatible with our system.

## 2024-10-24 NOTE — ED ADULT NURSE NOTE - NS_ED_NURSE_TEACHING_TOPIC_ED_A_ED
f/u with neurologist, f/u with orthopedist, f/u with PMD, return precautions reviewed./Orthopedic/Medications

## 2024-10-26 NOTE — ED POST DISCHARGE NOTE - RESULT SUMMARY
Radiology postdischarge discrepancy x-ray lumbosacral spine concerning for L2 endplate deformity with age indeterminant 10% compression fracture.  Patient seen for lower back pain.  Called patient, unable to reach, left message to return our call.  Provided both the patient results now phone number and the direct [facility] emergency room phone number so that patient can get in touch with a clinical staff member sooner.  Patient may require return to the ER for further imaging.

## 2024-11-05 ENCOUNTER — APPOINTMENT (OUTPATIENT)
Dept: OTOLARYNGOLOGY | Facility: CLINIC | Age: 64
End: 2024-11-05
Payer: MEDICAID

## 2024-11-05 VITALS — WEIGHT: 190 LBS | BODY MASS INDEX: 32.44 KG/M2 | HEIGHT: 64 IN

## 2024-11-05 DIAGNOSIS — R42 DIZZINESS AND GIDDINESS: ICD-10-CM

## 2024-11-05 DIAGNOSIS — M54.50 LOW BACK PAIN, UNSPECIFIED: ICD-10-CM

## 2024-11-05 DIAGNOSIS — H90.3 SENSORINEURAL HEARING LOSS, BILATERAL: ICD-10-CM

## 2024-11-05 DIAGNOSIS — R55 SYNCOPE AND COLLAPSE: ICD-10-CM

## 2024-11-05 PROCEDURE — 92504 EAR MICROSCOPY EXAMINATION: CPT

## 2024-11-05 PROCEDURE — 99204 OFFICE O/P NEW MOD 45 MIN: CPT

## 2024-11-05 PROCEDURE — 92567 TYMPANOMETRY: CPT

## 2024-11-05 PROCEDURE — 92557 COMPREHENSIVE HEARING TEST: CPT

## 2024-11-05 RX ORDER — FAMOTIDINE 10 MG/1
TABLET, FILM COATED ORAL
Refills: 0 | Status: ACTIVE | COMMUNITY

## 2024-12-06 ENCOUNTER — APPOINTMENT (OUTPATIENT)
Dept: OTOLARYNGOLOGY | Facility: CLINIC | Age: 64
End: 2024-12-06

## 2025-01-04 ENCOUNTER — EMERGENCY (EMERGENCY)
Facility: HOSPITAL | Age: 65
LOS: 0 days | Discharge: HOME | End: 2025-01-04
Attending: STUDENT IN AN ORGANIZED HEALTH CARE EDUCATION/TRAINING PROGRAM
Payer: MEDICAID

## 2025-01-04 VITALS
TEMPERATURE: 98 F | HEIGHT: 64 IN | HEART RATE: 72 BPM | SYSTOLIC BLOOD PRESSURE: 127 MMHG | OXYGEN SATURATION: 98 % | WEIGHT: 186.07 LBS | RESPIRATION RATE: 19 BRPM | DIASTOLIC BLOOD PRESSURE: 82 MMHG

## 2025-01-04 VITALS
OXYGEN SATURATION: 96 % | SYSTOLIC BLOOD PRESSURE: 133 MMHG | RESPIRATION RATE: 20 BRPM | DIASTOLIC BLOOD PRESSURE: 86 MMHG | TEMPERATURE: 98 F | HEART RATE: 74 BPM

## 2025-01-04 DIAGNOSIS — I10 ESSENTIAL (PRIMARY) HYPERTENSION: ICD-10-CM

## 2025-01-04 DIAGNOSIS — Z86.718 PERSONAL HISTORY OF OTHER VENOUS THROMBOSIS AND EMBOLISM: ICD-10-CM

## 2025-01-04 DIAGNOSIS — E78.5 HYPERLIPIDEMIA, UNSPECIFIED: ICD-10-CM

## 2025-01-04 DIAGNOSIS — M79.661 PAIN IN RIGHT LOWER LEG: ICD-10-CM

## 2025-01-04 RX ORDER — NAPROXEN SODIUM 275 MG
1 TABLET ORAL
Qty: 20 | Refills: 0
Start: 2025-01-04 | End: 2025-01-13

## 2025-01-10 ENCOUNTER — EMERGENCY (EMERGENCY)
Facility: HOSPITAL | Age: 65
LOS: 0 days | Discharge: HOME | End: 2025-01-10
Attending: STUDENT IN AN ORGANIZED HEALTH CARE EDUCATION/TRAINING PROGRAM
Payer: MEDICAID

## 2025-01-10 VITALS — HEART RATE: 90 BPM | DIASTOLIC BLOOD PRESSURE: 94 MMHG | SYSTOLIC BLOOD PRESSURE: 151 MMHG

## 2025-01-10 VITALS
DIASTOLIC BLOOD PRESSURE: 100 MMHG | HEART RATE: 60 BPM | RESPIRATION RATE: 18 BRPM | OXYGEN SATURATION: 100 % | SYSTOLIC BLOOD PRESSURE: 181 MMHG | HEIGHT: 64 IN | TEMPERATURE: 97 F | WEIGHT: 179.9 LBS

## 2025-01-10 DIAGNOSIS — Q28.3 OTHER MALFORMATIONS OF CEREBRAL VESSELS: ICD-10-CM

## 2025-01-10 DIAGNOSIS — R51.9 HEADACHE, UNSPECIFIED: ICD-10-CM

## 2025-01-10 DIAGNOSIS — E78.5 HYPERLIPIDEMIA, UNSPECIFIED: ICD-10-CM

## 2025-01-10 DIAGNOSIS — I10 ESSENTIAL (PRIMARY) HYPERTENSION: ICD-10-CM

## 2025-01-10 DIAGNOSIS — Z86.718 PERSONAL HISTORY OF OTHER VENOUS THROMBOSIS AND EMBOLISM: ICD-10-CM

## 2025-01-10 LAB
ALBUMIN SERPL ELPH-MCNC: 3.1 G/DL — LOW (ref 3.3–5)
ALP SERPL-CCNC: 107 U/L — SIGNIFICANT CHANGE UP (ref 40–120)
ALT FLD-CCNC: 20 U/L — SIGNIFICANT CHANGE UP (ref 12–78)
ANION GAP SERPL CALC-SCNC: 3 MMOL/L — LOW (ref 5–17)
AST SERPL-CCNC: 17 U/L — SIGNIFICANT CHANGE UP (ref 15–37)
BASOPHILS # BLD AUTO: 0.05 K/UL — SIGNIFICANT CHANGE UP (ref 0–0.2)
BASOPHILS NFR BLD AUTO: 0.8 % — SIGNIFICANT CHANGE UP (ref 0–2)
BILIRUB SERPL-MCNC: 0.5 MG/DL — SIGNIFICANT CHANGE UP (ref 0.2–1.2)
BUN SERPL-MCNC: 23 MG/DL — SIGNIFICANT CHANGE UP (ref 7–23)
CALCIUM SERPL-MCNC: 9.2 MG/DL — SIGNIFICANT CHANGE UP (ref 8.5–10.1)
CHLORIDE SERPL-SCNC: 109 MMOL/L — HIGH (ref 96–108)
CO2 SERPL-SCNC: 27 MMOL/L — SIGNIFICANT CHANGE UP (ref 22–31)
CREAT SERPL-MCNC: 1.18 MG/DL — SIGNIFICANT CHANGE UP (ref 0.5–1.3)
EGFR: 69 ML/MIN/1.73M2 — SIGNIFICANT CHANGE UP
EOSINOPHIL # BLD AUTO: 0.32 K/UL — SIGNIFICANT CHANGE UP (ref 0–0.5)
EOSINOPHIL NFR BLD AUTO: 4.9 % — SIGNIFICANT CHANGE UP (ref 0–6)
GLUCOSE SERPL-MCNC: 86 MG/DL — SIGNIFICANT CHANGE UP (ref 70–99)
HCT VFR BLD CALC: 43.8 % — SIGNIFICANT CHANGE UP (ref 39–50)
HGB BLD-MCNC: 14.6 G/DL — SIGNIFICANT CHANGE UP (ref 13–17)
IMM GRANULOCYTES NFR BLD AUTO: 0.5 % — SIGNIFICANT CHANGE UP (ref 0–0.9)
LYMPHOCYTES # BLD AUTO: 1.94 K/UL — SIGNIFICANT CHANGE UP (ref 1–3.3)
LYMPHOCYTES # BLD AUTO: 29.7 % — SIGNIFICANT CHANGE UP (ref 13–44)
MCHC RBC-ENTMCNC: 29.5 PG — SIGNIFICANT CHANGE UP (ref 27–34)
MCHC RBC-ENTMCNC: 33.3 G/DL — SIGNIFICANT CHANGE UP (ref 32–36)
MCV RBC AUTO: 88.5 FL — SIGNIFICANT CHANGE UP (ref 80–100)
MONOCYTES # BLD AUTO: 0.5 K/UL — SIGNIFICANT CHANGE UP (ref 0–0.9)
MONOCYTES NFR BLD AUTO: 7.6 % — SIGNIFICANT CHANGE UP (ref 2–14)
NEUTROPHILS # BLD AUTO: 3.7 K/UL — SIGNIFICANT CHANGE UP (ref 1.8–7.4)
NEUTROPHILS NFR BLD AUTO: 56.5 % — SIGNIFICANT CHANGE UP (ref 43–77)
NRBC # BLD: 0 /100 WBCS — SIGNIFICANT CHANGE UP (ref 0–0)
PLATELET # BLD AUTO: 181 K/UL — SIGNIFICANT CHANGE UP (ref 150–400)
POTASSIUM SERPL-MCNC: 4.2 MMOL/L — SIGNIFICANT CHANGE UP (ref 3.5–5.3)
POTASSIUM SERPL-SCNC: 4.2 MMOL/L — SIGNIFICANT CHANGE UP (ref 3.5–5.3)
PROT SERPL-MCNC: 7.1 GM/DL — SIGNIFICANT CHANGE UP (ref 6–8.3)
RBC # BLD: 4.95 M/UL — SIGNIFICANT CHANGE UP (ref 4.2–5.8)
RBC # FLD: 14.3 % — SIGNIFICANT CHANGE UP (ref 10.3–14.5)
SODIUM SERPL-SCNC: 139 MMOL/L — SIGNIFICANT CHANGE UP (ref 135–145)
WBC # BLD: 6.54 K/UL — SIGNIFICANT CHANGE UP (ref 3.8–10.5)
WBC # FLD AUTO: 6.54 K/UL — SIGNIFICANT CHANGE UP (ref 3.8–10.5)

## 2025-01-10 PROCEDURE — 93010 ELECTROCARDIOGRAM REPORT: CPT

## 2025-01-10 PROCEDURE — 70450 CT HEAD/BRAIN W/O DYE: CPT | Mod: 26

## 2025-01-10 PROCEDURE — 70553 MRI BRAIN STEM W/O & W/DYE: CPT | Mod: 26

## 2025-01-10 PROCEDURE — 99285 EMERGENCY DEPT VISIT HI MDM: CPT

## 2025-01-10 RX ORDER — SODIUM CHLORIDE 9 MG/ML
1000 INJECTION, SOLUTION INTRAMUSCULAR; INTRAVENOUS; SUBCUTANEOUS ONCE
Refills: 0 | Status: COMPLETED | OUTPATIENT
Start: 2025-01-10 | End: 2025-01-10

## 2025-01-10 RX ORDER — ENOXAPARIN SODIUM 60 MG/.6ML
80 INJECTION INTRAVENOUS; SUBCUTANEOUS ONCE
Refills: 0 | Status: COMPLETED | OUTPATIENT
Start: 2025-01-10 | End: 2025-01-10

## 2025-01-10 RX ORDER — MIDODRINE HYDROCHLORIDE 5 MG/1
5 TABLET ORAL ONCE
Refills: 0 | Status: COMPLETED | OUTPATIENT
Start: 2025-01-10 | End: 2025-01-10

## 2025-01-10 RX ADMIN — SODIUM CHLORIDE 1000 MILLILITER(S): 9 INJECTION, SOLUTION INTRAMUSCULAR; INTRAVENOUS; SUBCUTANEOUS at 22:16

## 2025-01-10 RX ADMIN — ENOXAPARIN SODIUM 80 MILLIGRAM(S): 60 INJECTION INTRAVENOUS; SUBCUTANEOUS at 16:29

## 2025-01-10 NOTE — ED PROVIDER NOTE - CARE PROVIDER_API CALL
Chi Curry  Neurology  [address], [address]  [place] [address]  Phone: [phone]  Fax: [phone]  Established Patient  Follow Up Time: 7-10 Days

## 2025-01-10 NOTE — ED ADULT NURSE NOTE - NSFALLUNIVINTERV_ED_ALL_ED
Bed/Stretcher in lowest position, wheels locked, appropriate side rails in place/Call bell, personal items and telephone in reach/Instruct patient to call for assistance before getting out of bed/chair/stretcher/Non-slip footwear applied when patient is off stretcher/[place] to call system/Physically safe environment - no spills, clutter or unnecessary equipment/Purposeful proactive rounding/Room/bathroom lighting operational, light cord in reach

## 2025-01-10 NOTE — ED PROVIDER NOTE - PROGRESS NOTE DETAILS
Elaine: Pt care signed out to me at change of shift, pending MRI brain. MRI w/o acute abnormality. Cavernous malformation unchanged. On re-eval, resting comfortably. Pt states w/o dizziness currently. Wife at bedside. Stable for d/c home. Instructed for close outpatient Neuro, Cardio and PCP f/u. Wife states w/ Cardio appt scheduled for 1/17. Return signs / symptoms d/w pt, wife at length. They understand / agree w/ this plan.

## 2025-01-10 NOTE — ED PROVIDER NOTE - OBJECTIVE STATEMENT
65 y/o M with PMH HLD, hypotension on midodrine, here today with dizziness. Patient states he has been having dizziness since he fell in April that is reoccurring. Prior MRI showed cavernous malformation. States he has been having some right sided headache that radiates to his neck over the past day. Today, was feeling dizzy and off balance. Per EMS, BP was low in the field. Patient took his midodrine with improvement of BP. He is no longer feeling dizzy. He denies CP, SOB, N/V/D, fever, chills. Was recently seen for superficial thrombosis in R calf.

## 2025-01-10 NOTE — ED ADULT NURSE NOTE - OBJECTIVE STATEMENT
Pt AOx4, responsive, ambulatory w wife at bedside. dizziness hx hypotension and dizziness b/p sitting was unable to obtain by ems ,Pt  was having posterior head pain,  took  midodrine at 0600 , pt fell in April 2024 an ht his head  .nkda. Hx DVT 15yrs ago, Hypotension.

## 2025-01-10 NOTE — ED PROVIDER NOTE - PHYSICAL EXAMINATION
GENERAL: Awake, alert, NAD  HEENT: NC/AT, moist mucous membranes, PERRL, EOMI  LUNGS: CTAB, no wheezes or crackles   CARDIAC: RRR, no m/r/g  ABDOMEN: Soft, non tender, non distended, no rebound, no guarding  BACK: No midline spinal tenderness, no CVA tenderness  EXT: No edema, no calf tenderness, no deformities.  NEURO: A&Ox3. Moving all extremities. 5/5 strengths all extremities.   SKIN: Warm and dry. No rash.  PSYCH: Normal affect.

## 2025-01-10 NOTE — ED ADULT NURSE REASSESSMENT NOTE - NS ED NURSE REASSESS COMMENT FT1
Lunch provided. Wife at bedside
Pt resting comfortably at this time. No s/s of pain noted. Awaiting MRI
Pt resting comfortably at this time. No s/s of pain noted. Awaiting MRI
Sent to CT via stretcher at this time. Wife at bedside. breakfast provided.
pt ambulated to bathroom multiple times to bathroom with assistance. MRI called again with no update. pt awaiting  Wife at bedside
pt fully undressed for MRI and checked for stickers. Checklist completed
Pt resting comfortably at this time. No s/s of pain noted. Awaiting MRI

## 2025-01-10 NOTE — ED ADULT NURSE NOTE - CHIEF COMPLAINT QUOTE
dizziness hx hypotension and dizziness b/p sitting was unable to obtain by ems ,Pt  was having posterior head pain,  took  midodrine at 0600 , pt fell in April 2024 an ht his head  .

## 2025-01-10 NOTE — ED PROVIDER NOTE - PATIENT PORTAL LINK FT
You can access the FollowMyHealth Patient Portal offered by [facility] by registering at the following website: [email or web address]. By joining [facility]’s FollowMyHealth portal, you will also be able to view your health information using other applications (apps) compatible with our system.

## 2025-01-10 NOTE — ED ADULT TRIAGE NOTE - CHIEF COMPLAINT QUOTE
dizziness hx hypotension and dizziness b/p sitting was unable to obtain by ems, takes midodrine at 0600 dizziness hx hypotension and dizziness b/p sitting was unable to obtain by ems ,Pt  was having posterior head pain,  took  midodrine at 0600 , pt fell in April 2024 an ht his head  .

## 2025-01-10 NOTE — ED PROVIDER NOTE - CLINICAL SUMMARY MEDICAL DECISION MAKING FREE TEXT BOX
65 y/o M with PMH HLD, hypotension on midodrine, here today with dizziness.  BP elevated, likely secondary to midodrine.  Patient feeling improved.  Neuro intact.  In setting of headache/dizziness, will plan for labs and CTH.  EKG is NSR.    CTH with possible new lesion unseen on prior imaging, results discussed with patient, will obtain MRI. Per MRI, imaging should be performed today.

## 2025-07-22 ENCOUNTER — INPATIENT (INPATIENT)
Facility: HOSPITAL | Age: 65
LOS: 0 days | Discharge: HOME | End: 2025-07-23
Attending: STUDENT IN AN ORGANIZED HEALTH CARE EDUCATION/TRAINING PROGRAM | Admitting: STUDENT IN AN ORGANIZED HEALTH CARE EDUCATION/TRAINING PROGRAM
Payer: MEDICAID

## 2025-07-22 VITALS
DIASTOLIC BLOOD PRESSURE: 85 MMHG | WEIGHT: 184.97 LBS | OXYGEN SATURATION: 86 % | HEIGHT: 60 IN | HEART RATE: 97 BPM | RESPIRATION RATE: 32 BRPM | SYSTOLIC BLOOD PRESSURE: 155 MMHG | TEMPERATURE: 99 F

## 2025-07-22 DIAGNOSIS — J18.9 PNEUMONIA, UNSPECIFIED ORGANISM: ICD-10-CM

## 2025-07-22 DIAGNOSIS — I95.1 ORTHOSTATIC HYPOTENSION: ICD-10-CM

## 2025-07-22 DIAGNOSIS — N31.9 NEUROMUSCULAR DYSFUNCTION OF BLADDER, UNSPECIFIED: ICD-10-CM

## 2025-07-22 DIAGNOSIS — E78.5 HYPERLIPIDEMIA, UNSPECIFIED: ICD-10-CM

## 2025-07-22 DIAGNOSIS — N40.0 BENIGN PROSTATIC HYPERPLASIA WITHOUT LOWER URINARY TRACT SYMPTOMS: ICD-10-CM

## 2025-07-22 DIAGNOSIS — J96.01 ACUTE RESPIRATORY FAILURE WITH HYPOXIA: ICD-10-CM

## 2025-07-22 LAB
ALBUMIN SERPL ELPH-MCNC: 3.4 G/DL — SIGNIFICANT CHANGE UP (ref 3.3–5)
ALP SERPL-CCNC: 98 U/L — SIGNIFICANT CHANGE UP (ref 40–120)
ALT FLD-CCNC: 21 U/L — SIGNIFICANT CHANGE UP (ref 12–78)
ANION GAP SERPL CALC-SCNC: 6 MMOL/L — SIGNIFICANT CHANGE UP (ref 5–17)
APTT BLD: 26.9 SEC — SIGNIFICANT CHANGE UP (ref 26.1–36.8)
AST SERPL-CCNC: 14 U/L — LOW (ref 15–37)
BASOPHILS # BLD AUTO: 0.03 K/UL — SIGNIFICANT CHANGE UP (ref 0–0.2)
BASOPHILS NFR BLD AUTO: 0.3 % — SIGNIFICANT CHANGE UP (ref 0–2)
BILIRUB SERPL-MCNC: 0.9 MG/DL — SIGNIFICANT CHANGE UP (ref 0.2–1.2)
BUN SERPL-MCNC: 18 MG/DL — SIGNIFICANT CHANGE UP (ref 7–23)
CALCIUM SERPL-MCNC: 8.6 MG/DL — SIGNIFICANT CHANGE UP (ref 8.5–10.1)
CHLORIDE SERPL-SCNC: 105 MMOL/L — SIGNIFICANT CHANGE UP (ref 96–108)
CO2 SERPL-SCNC: 27 MMOL/L — SIGNIFICANT CHANGE UP (ref 22–31)
CREAT SERPL-MCNC: 0.73 MG/DL — SIGNIFICANT CHANGE UP (ref 0.5–1.3)
EGFR: 102 ML/MIN/1.73M2 — SIGNIFICANT CHANGE UP
EGFR: 102 ML/MIN/1.73M2 — SIGNIFICANT CHANGE UP
EOSINOPHIL # BLD AUTO: 0.06 K/UL — SIGNIFICANT CHANGE UP (ref 0–0.5)
EOSINOPHIL NFR BLD AUTO: 0.5 % — SIGNIFICANT CHANGE UP (ref 0–6)
FLUAV AG NPH QL: SIGNIFICANT CHANGE UP
FLUBV AG NPH QL: SIGNIFICANT CHANGE UP
GLUCOSE SERPL-MCNC: 98 MG/DL — SIGNIFICANT CHANGE UP (ref 70–99)
HCT VFR BLD CALC: 46 % — SIGNIFICANT CHANGE UP (ref 39–50)
HGB BLD-MCNC: 14.7 G/DL — SIGNIFICANT CHANGE UP (ref 13–17)
IMM GRANULOCYTES NFR BLD AUTO: 0.3 % — SIGNIFICANT CHANGE UP (ref 0–0.9)
INR BLD: 1.03 RATIO — SIGNIFICANT CHANGE UP (ref 0.85–1.16)
LIDOCAIN IGE QN: 27 U/L — SIGNIFICANT CHANGE UP (ref 13–75)
LYMPHOCYTES # BLD AUTO: 0.94 K/UL — LOW (ref 1–3.3)
LYMPHOCYTES # BLD AUTO: 8.2 % — LOW (ref 13–44)
MAGNESIUM SERPL-MCNC: 1.7 MG/DL — SIGNIFICANT CHANGE UP (ref 1.6–2.6)
MCHC RBC-ENTMCNC: 28.4 PG — SIGNIFICANT CHANGE UP (ref 27–34)
MCHC RBC-ENTMCNC: 32 G/DL — SIGNIFICANT CHANGE UP (ref 32–36)
MCV RBC AUTO: 89 FL — SIGNIFICANT CHANGE UP (ref 80–100)
MONOCYTES # BLD AUTO: 0.45 K/UL — SIGNIFICANT CHANGE UP (ref 0–0.9)
MONOCYTES NFR BLD AUTO: 3.9 % — SIGNIFICANT CHANGE UP (ref 2–14)
NEUTROPHILS # BLD AUTO: 9.97 K/UL — HIGH (ref 1.8–7.4)
NEUTROPHILS NFR BLD AUTO: 86.8 % — HIGH (ref 43–77)
NRBC BLD AUTO-RTO: 0 /100 WBCS — SIGNIFICANT CHANGE UP (ref 0–0)
NT-PROBNP SERPL-SCNC: 58 PG/ML — SIGNIFICANT CHANGE UP (ref 0–125)
PLATELET # BLD AUTO: 156 K/UL — SIGNIFICANT CHANGE UP (ref 150–400)
POTASSIUM SERPL-MCNC: 4.1 MMOL/L — SIGNIFICANT CHANGE UP (ref 3.5–5.3)
POTASSIUM SERPL-SCNC: 4.1 MMOL/L — SIGNIFICANT CHANGE UP (ref 3.5–5.3)
PROT SERPL-MCNC: 7.7 GM/DL — SIGNIFICANT CHANGE UP (ref 6–8.3)
PROTHROM AB SERPL-ACNC: 11.6 SEC — SIGNIFICANT CHANGE UP (ref 9.9–13.4)
RBC # BLD: 5.17 M/UL — SIGNIFICANT CHANGE UP (ref 4.2–5.8)
RBC # FLD: 14.8 % — HIGH (ref 10.3–14.5)
RSV RNA NPH QL NAA+NON-PROBE: SIGNIFICANT CHANGE UP
SARS-COV-2 RNA SPEC QL NAA+PROBE: SIGNIFICANT CHANGE UP
SODIUM SERPL-SCNC: 138 MMOL/L — SIGNIFICANT CHANGE UP (ref 135–145)
SOURCE RESPIRATORY: SIGNIFICANT CHANGE UP
TROPONIN I, HIGH SENSITIVITY RESULT: 32.7 NG/L — SIGNIFICANT CHANGE UP
WBC # BLD: 11.49 K/UL — HIGH (ref 3.8–10.5)
WBC # FLD AUTO: 11.49 K/UL — HIGH (ref 3.8–10.5)

## 2025-07-22 PROCEDURE — 99223 1ST HOSP IP/OBS HIGH 75: CPT

## 2025-07-22 PROCEDURE — 93010 ELECTROCARDIOGRAM REPORT: CPT

## 2025-07-22 PROCEDURE — 99285 EMERGENCY DEPT VISIT HI MDM: CPT

## 2025-07-22 PROCEDURE — 71045 X-RAY EXAM CHEST 1 VIEW: CPT | Mod: 26

## 2025-07-22 PROCEDURE — 71275 CT ANGIOGRAPHY CHEST: CPT | Mod: 26

## 2025-07-22 RX ORDER — AZITHROMYCIN 250 MG
500 CAPSULE ORAL EVERY 24 HOURS
Refills: 0 | Status: DISCONTINUED | OUTPATIENT
Start: 2025-07-23 | End: 2025-07-23

## 2025-07-22 RX ORDER — ONDANSETRON HCL/PF 4 MG/2 ML
4 VIAL (ML) INJECTION EVERY 8 HOURS
Refills: 0 | Status: DISCONTINUED | OUTPATIENT
Start: 2025-07-22 | End: 2025-07-23

## 2025-07-22 RX ORDER — AZITHROMYCIN 250 MG
500 CAPSULE ORAL ONCE
Refills: 0 | Status: COMPLETED | OUTPATIENT
Start: 2025-07-22 | End: 2025-07-22

## 2025-07-22 RX ORDER — MELATONIN 5 MG
3 TABLET ORAL AT BEDTIME
Refills: 0 | Status: DISCONTINUED | OUTPATIENT
Start: 2025-07-22 | End: 2025-07-23

## 2025-07-22 RX ORDER — MECLIZINE HCL 12.5 MG
25 TABLET ORAL THREE TIMES A DAY
Refills: 0 | Status: DISCONTINUED | OUTPATIENT
Start: 2025-07-22 | End: 2025-07-23

## 2025-07-22 RX ORDER — IPRATROPIUM BROMIDE AND ALBUTEROL SULFATE .5; 2.5 MG/3ML; MG/3ML
3 SOLUTION RESPIRATORY (INHALATION) EVERY 6 HOURS
Refills: 0 | Status: DISCONTINUED | OUTPATIENT
Start: 2025-07-22 | End: 2025-07-23

## 2025-07-22 RX ORDER — CEFTRIAXONE 500 MG/1
1000 INJECTION, POWDER, FOR SOLUTION INTRAMUSCULAR; INTRAVENOUS ONCE
Refills: 0 | Status: DISCONTINUED | OUTPATIENT
Start: 2025-07-22 | End: 2025-07-22

## 2025-07-22 RX ORDER — MIDODRINE HYDROCHLORIDE 5 MG/1
1 TABLET ORAL
Refills: 0 | DISCHARGE

## 2025-07-22 RX ORDER — ENOXAPARIN SODIUM 100 MG/ML
40 INJECTION SUBCUTANEOUS EVERY 24 HOURS
Refills: 0 | Status: DISCONTINUED | OUTPATIENT
Start: 2025-07-22 | End: 2025-07-23

## 2025-07-22 RX ORDER — ACETAMINOPHEN 500 MG/5ML
650 LIQUID (ML) ORAL EVERY 6 HOURS
Refills: 0 | Status: DISCONTINUED | OUTPATIENT
Start: 2025-07-22 | End: 2025-07-23

## 2025-07-22 RX ORDER — CYCLOBENZAPRINE HYDROCHLORIDE 15 MG/1
5 CAPSULE, EXTENDED RELEASE ORAL THREE TIMES A DAY
Refills: 0 | Status: DISCONTINUED | OUTPATIENT
Start: 2025-07-22 | End: 2025-07-23

## 2025-07-22 RX ORDER — MIDODRINE HYDROCHLORIDE 5 MG/1
2.5 TABLET ORAL THREE TIMES A DAY
Refills: 0 | Status: DISCONTINUED | OUTPATIENT
Start: 2025-07-22 | End: 2025-07-23

## 2025-07-22 RX ORDER — CEFTRIAXONE 500 MG/1
1000 INJECTION, POWDER, FOR SOLUTION INTRAMUSCULAR; INTRAVENOUS EVERY 24 HOURS
Refills: 0 | Status: DISCONTINUED | OUTPATIENT
Start: 2025-07-23 | End: 2025-07-23

## 2025-07-22 RX ORDER — ATORVASTATIN CALCIUM 80 MG/1
20 TABLET, FILM COATED ORAL AT BEDTIME
Refills: 0 | Status: DISCONTINUED | OUTPATIENT
Start: 2025-07-22 | End: 2025-07-23

## 2025-07-22 RX ORDER — TAMSULOSIN HYDROCHLORIDE 0.4 MG/1
1 CAPSULE ORAL
Refills: 0 | DISCHARGE

## 2025-07-22 RX ORDER — TAMSULOSIN HYDROCHLORIDE 0.4 MG/1
0.4 CAPSULE ORAL AT BEDTIME
Refills: 0 | Status: DISCONTINUED | OUTPATIENT
Start: 2025-07-22 | End: 2025-07-23

## 2025-07-22 RX ORDER — CEFTRIAXONE 500 MG/1
1000 INJECTION, POWDER, FOR SOLUTION INTRAMUSCULAR; INTRAVENOUS ONCE
Refills: 0 | Status: COMPLETED | OUTPATIENT
Start: 2025-07-22 | End: 2025-07-22

## 2025-07-22 RX ORDER — MECLIZINE HCL 12.5 MG
1 TABLET ORAL
Refills: 0 | DISCHARGE

## 2025-07-22 RX ADMIN — CEFTRIAXONE 1000 MILLIGRAM(S): 500 INJECTION, POWDER, FOR SOLUTION INTRAMUSCULAR; INTRAVENOUS at 12:22

## 2025-07-22 RX ADMIN — ENOXAPARIN SODIUM 40 MILLIGRAM(S): 100 INJECTION SUBCUTANEOUS at 17:32

## 2025-07-22 RX ADMIN — MIDODRINE HYDROCHLORIDE 2.5 MILLIGRAM(S): 5 TABLET ORAL at 20:00

## 2025-07-22 RX ADMIN — Medication 255 MILLIGRAM(S): at 12:22

## 2025-07-22 RX ADMIN — Medication 20 MILLIGRAM(S): at 17:32

## 2025-07-22 RX ADMIN — TAMSULOSIN HYDROCHLORIDE 0.4 MILLIGRAM(S): 0.4 CAPSULE ORAL at 21:11

## 2025-07-22 RX ADMIN — ATORVASTATIN CALCIUM 20 MILLIGRAM(S): 80 TABLET, FILM COATED ORAL at 21:11

## 2025-07-23 ENCOUNTER — TRANSCRIPTION ENCOUNTER (OUTPATIENT)
Age: 65
End: 2025-07-23

## 2025-07-23 VITALS
RESPIRATION RATE: 18 BRPM | HEART RATE: 74 BPM | OXYGEN SATURATION: 94 % | DIASTOLIC BLOOD PRESSURE: 78 MMHG | SYSTOLIC BLOOD PRESSURE: 121 MMHG | TEMPERATURE: 98 F

## 2025-07-23 LAB
ALBUMIN SERPL ELPH-MCNC: 2.8 G/DL — LOW (ref 3.3–5)
ALP SERPL-CCNC: 74 U/L — SIGNIFICANT CHANGE UP (ref 40–120)
ALT FLD-CCNC: 19 U/L — SIGNIFICANT CHANGE UP (ref 12–78)
ANION GAP SERPL CALC-SCNC: 8 MMOL/L — SIGNIFICANT CHANGE UP (ref 5–17)
APPEARANCE UR: ABNORMAL
AST SERPL-CCNC: 12 U/L — LOW (ref 15–37)
BACTERIA # UR AUTO: ABNORMAL /HPF
BILIRUB SERPL-MCNC: 1.2 MG/DL — SIGNIFICANT CHANGE UP (ref 0.2–1.2)
BILIRUB UR-MCNC: NEGATIVE — SIGNIFICANT CHANGE UP
BUN SERPL-MCNC: 19 MG/DL — SIGNIFICANT CHANGE UP (ref 7–23)
CALCIUM SERPL-MCNC: 8.5 MG/DL — SIGNIFICANT CHANGE UP (ref 8.5–10.1)
CHLORIDE SERPL-SCNC: 103 MMOL/L — SIGNIFICANT CHANGE UP (ref 96–108)
CO2 SERPL-SCNC: 26 MMOL/L — SIGNIFICANT CHANGE UP (ref 22–31)
COLOR SPEC: YELLOW — SIGNIFICANT CHANGE UP
CREAT SERPL-MCNC: 0.64 MG/DL — SIGNIFICANT CHANGE UP (ref 0.5–1.3)
DIFF PNL FLD: ABNORMAL
EGFR: 106 ML/MIN/1.73M2 — SIGNIFICANT CHANGE UP
EGFR: 106 ML/MIN/1.73M2 — SIGNIFICANT CHANGE UP
EPI CELLS # UR: PRESENT
GLUCOSE SERPL-MCNC: 84 MG/DL — SIGNIFICANT CHANGE UP (ref 70–99)
GLUCOSE UR QL: NEGATIVE MG/DL — SIGNIFICANT CHANGE UP
HCT VFR BLD CALC: 40.1 % — SIGNIFICANT CHANGE UP (ref 39–50)
HGB BLD-MCNC: 12.8 G/DL — LOW (ref 13–17)
KETONES UR QL: NEGATIVE MG/DL — SIGNIFICANT CHANGE UP
LEGIONELLA AG UR QL: NEGATIVE — SIGNIFICANT CHANGE UP
LEUKOCYTE ESTERASE UR-ACNC: ABNORMAL
MAGNESIUM SERPL-MCNC: 1.9 MG/DL — SIGNIFICANT CHANGE UP (ref 1.6–2.6)
MCHC RBC-ENTMCNC: 28.5 PG — SIGNIFICANT CHANGE UP (ref 27–34)
MCHC RBC-ENTMCNC: 31.9 G/DL — LOW (ref 32–36)
MCV RBC AUTO: 89.3 FL — SIGNIFICANT CHANGE UP (ref 80–100)
NITRITE UR-MCNC: NEGATIVE — SIGNIFICANT CHANGE UP
NRBC BLD AUTO-RTO: 0 /100 WBCS — SIGNIFICANT CHANGE UP (ref 0–0)
PH UR: 6 — SIGNIFICANT CHANGE UP (ref 5–8)
PHOSPHATE SERPL-MCNC: 2.8 MG/DL — SIGNIFICANT CHANGE UP (ref 2.5–4.5)
PLATELET # BLD AUTO: 157 K/UL — SIGNIFICANT CHANGE UP (ref 150–400)
POTASSIUM SERPL-MCNC: 3.8 MMOL/L — SIGNIFICANT CHANGE UP (ref 3.5–5.3)
POTASSIUM SERPL-SCNC: 3.8 MMOL/L — SIGNIFICANT CHANGE UP (ref 3.5–5.3)
PROT SERPL-MCNC: 6.6 GM/DL — SIGNIFICANT CHANGE UP (ref 6–8.3)
PROT UR-MCNC: SIGNIFICANT CHANGE UP MG/DL
RBC # BLD: 4.49 M/UL — SIGNIFICANT CHANGE UP (ref 4.2–5.8)
RBC # FLD: 15 % — HIGH (ref 10.3–14.5)
RBC CASTS # UR COMP ASSIST: 2 /HPF — SIGNIFICANT CHANGE UP (ref 0–4)
S PNEUM AG UR QL: NEGATIVE — SIGNIFICANT CHANGE UP
SODIUM SERPL-SCNC: 137 MMOL/L — SIGNIFICANT CHANGE UP (ref 135–145)
SP GR SPEC: 1.02 — SIGNIFICANT CHANGE UP (ref 1–1.03)
UROBILINOGEN FLD QL: 1 MG/DL — SIGNIFICANT CHANGE UP (ref 0.2–1)
WBC # BLD: 8.03 K/UL — SIGNIFICANT CHANGE UP (ref 3.8–10.5)
WBC # FLD AUTO: 8.03 K/UL — SIGNIFICANT CHANGE UP (ref 3.8–10.5)
WBC UR QL: >50 /HPF — HIGH (ref 0–5)

## 2025-07-23 PROCEDURE — ZZZZZ: CPT

## 2025-07-23 RX ORDER — AMOXICILLIN AND CLAVULANATE POTASSIUM 500; 125 MG/1; MG/1
1 TABLET, FILM COATED ORAL
Qty: 8 | Refills: 0
Start: 2025-07-23 | End: 2025-07-26

## 2025-07-23 RX ADMIN — Medication 20 MILLIGRAM(S): at 05:06

## 2025-07-23 RX ADMIN — MIDODRINE HYDROCHLORIDE 2.5 MILLIGRAM(S): 5 TABLET ORAL at 11:56

## 2025-07-23 RX ADMIN — Medication 20 MILLIGRAM(S): at 17:37

## 2025-07-23 RX ADMIN — Medication 650 MILLIGRAM(S): at 04:47

## 2025-07-23 RX ADMIN — MIDODRINE HYDROCHLORIDE 2.5 MILLIGRAM(S): 5 TABLET ORAL at 05:06

## 2025-07-23 RX ADMIN — CEFTRIAXONE 100 MILLIGRAM(S): 500 INJECTION, POWDER, FOR SOLUTION INTRAMUSCULAR; INTRAVENOUS at 11:56

## 2025-07-23 RX ADMIN — Medication 255 MILLIGRAM(S): at 09:47

## 2025-07-23 RX ADMIN — ENOXAPARIN SODIUM 40 MILLIGRAM(S): 100 INJECTION SUBCUTANEOUS at 17:37

## 2025-07-23 RX ADMIN — Medication 650 MILLIGRAM(S): at 05:40

## 2025-07-24 LAB
CULTURE RESULTS: NO GROWTH — SIGNIFICANT CHANGE UP
SPECIMEN SOURCE: SIGNIFICANT CHANGE UP

## 2025-07-27 LAB
CULTURE RESULTS: SIGNIFICANT CHANGE UP
CULTURE RESULTS: SIGNIFICANT CHANGE UP
SPECIMEN SOURCE: SIGNIFICANT CHANGE UP
SPECIMEN SOURCE: SIGNIFICANT CHANGE UP

## 2025-07-30 DIAGNOSIS — K21.9 GASTRO-ESOPHAGEAL REFLUX DISEASE WITHOUT ESOPHAGITIS: ICD-10-CM

## 2025-07-30 DIAGNOSIS — E78.5 HYPERLIPIDEMIA, UNSPECIFIED: ICD-10-CM

## 2025-07-30 DIAGNOSIS — I95.1 ORTHOSTATIC HYPOTENSION: ICD-10-CM

## 2025-07-30 DIAGNOSIS — N31.9 NEUROMUSCULAR DYSFUNCTION OF BLADDER, UNSPECIFIED: ICD-10-CM

## 2025-07-30 DIAGNOSIS — J96.01 ACUTE RESPIRATORY FAILURE WITH HYPOXIA: ICD-10-CM

## 2025-07-30 DIAGNOSIS — Z11.52 ENCOUNTER FOR SCREENING FOR COVID-19: ICD-10-CM

## 2025-07-30 DIAGNOSIS — J18.9 PNEUMONIA, UNSPECIFIED ORGANISM: ICD-10-CM

## 2025-07-30 DIAGNOSIS — N40.0 BENIGN PROSTATIC HYPERPLASIA WITHOUT LOWER URINARY TRACT SYMPTOMS: ICD-10-CM
